# Patient Record
Sex: MALE | Race: AMERICAN INDIAN OR ALASKA NATIVE
[De-identification: names, ages, dates, MRNs, and addresses within clinical notes are randomized per-mention and may not be internally consistent; named-entity substitution may affect disease eponyms.]

---

## 2017-11-14 ENCOUNTER — HOSPITAL ENCOUNTER (EMERGENCY)
Dept: HOSPITAL 46 - ED | Age: 42
Discharge: HOME | End: 2017-11-14
Payer: COMMERCIAL

## 2017-11-14 VITALS — HEIGHT: 69 IN | BODY MASS INDEX: 26.66 KG/M2 | WEIGHT: 180.01 LBS

## 2017-11-14 DIAGNOSIS — Z88.6: ICD-10-CM

## 2017-11-14 DIAGNOSIS — K29.20: Primary | ICD-10-CM

## 2017-11-14 DIAGNOSIS — Z88.0: ICD-10-CM

## 2017-11-14 DIAGNOSIS — F17.200: ICD-10-CM
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
## 2017-11-14 PROCEDURE — G0480 DRUG TEST DEF 1-7 CLASSES: HCPCS

## 2018-05-31 ENCOUNTER — HOSPITAL ENCOUNTER (EMERGENCY)
Dept: HOSPITAL 46 - ED | Age: 43
Discharge: HOME | End: 2018-05-31
Payer: COMMERCIAL

## 2018-05-31 VITALS — BODY MASS INDEX: 26.66 KG/M2 | HEIGHT: 69 IN | WEIGHT: 180.01 LBS

## 2018-05-31 DIAGNOSIS — F17.200: ICD-10-CM
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
## 2018-05-31 DIAGNOSIS — E11.9: ICD-10-CM

## 2018-05-31 DIAGNOSIS — Z88.0: ICD-10-CM

## 2018-05-31 DIAGNOSIS — S30.1XXA: Primary | ICD-10-CM

## 2018-05-31 DIAGNOSIS — S20.211A: ICD-10-CM

## 2018-05-31 DIAGNOSIS — I10: ICD-10-CM

## 2018-05-31 PROCEDURE — G0480 DRUG TEST DEF 1-7 CLASSES: HCPCS

## 2018-06-01 ENCOUNTER — HOSPITAL ENCOUNTER (EMERGENCY)
Dept: HOSPITAL 46 - ED | Age: 43
LOS: 1 days | Discharge: HOME | End: 2018-06-02
Payer: COMMERCIAL

## 2018-06-01 VITALS — BODY MASS INDEX: 25.92 KG/M2 | HEIGHT: 69 IN | WEIGHT: 175 LBS
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
## 2018-06-01 DIAGNOSIS — Z88.0: ICD-10-CM

## 2018-06-01 DIAGNOSIS — F17.200: ICD-10-CM

## 2018-06-01 DIAGNOSIS — I10: ICD-10-CM

## 2018-06-01 DIAGNOSIS — Z88.5: ICD-10-CM

## 2018-06-01 DIAGNOSIS — E11.9: ICD-10-CM

## 2018-06-01 DIAGNOSIS — S20.211A: Primary | ICD-10-CM

## 2018-06-03 ENCOUNTER — HOSPITAL ENCOUNTER (EMERGENCY)
Dept: HOSPITAL 46 - ED | Age: 43
LOS: 1 days | Discharge: HOME | End: 2018-06-04
Payer: COMMERCIAL

## 2018-06-03 VITALS — WEIGHT: 175 LBS | HEIGHT: 69 IN | BODY MASS INDEX: 25.92 KG/M2

## 2018-06-03 DIAGNOSIS — Z88.0: ICD-10-CM

## 2018-06-03 DIAGNOSIS — F10.239: Primary | ICD-10-CM

## 2018-06-03 DIAGNOSIS — F17.200: ICD-10-CM

## 2018-06-03 DIAGNOSIS — Z88.6: ICD-10-CM

## 2018-06-03 DIAGNOSIS — I10: ICD-10-CM

## 2018-06-03 DIAGNOSIS — Z79.899: ICD-10-CM
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
## 2018-06-03 DIAGNOSIS — E11.9: ICD-10-CM

## 2018-06-03 DIAGNOSIS — Z91.14: ICD-10-CM

## 2018-06-03 PROCEDURE — G0480 DRUG TEST DEF 1-7 CLASSES: HCPCS

## 2018-09-25 ENCOUNTER — HOSPITAL ENCOUNTER (EMERGENCY)
Dept: HOSPITAL 46 - ED | Age: 43
Discharge: HOME | End: 2018-09-25
Payer: COMMERCIAL

## 2018-09-25 VITALS — BODY MASS INDEX: 25.92 KG/M2 | HEIGHT: 69 IN | WEIGHT: 175 LBS

## 2018-09-25 DIAGNOSIS — Z88.8: ICD-10-CM

## 2018-09-25 DIAGNOSIS — Z79.899: ICD-10-CM

## 2018-09-25 DIAGNOSIS — Z88.0: ICD-10-CM

## 2018-09-25 DIAGNOSIS — I10: ICD-10-CM

## 2018-09-25 DIAGNOSIS — F17.200: ICD-10-CM

## 2018-09-25 DIAGNOSIS — F43.21: Primary | ICD-10-CM

## 2018-09-25 DIAGNOSIS — E11.9: ICD-10-CM

## 2018-09-25 DIAGNOSIS — F10.10: ICD-10-CM

## 2018-09-25 NOTE — XMS
Clinical Summary
  Created on: 2018
 
 Pramod Arnold
 External Reference #: [id number]
 : 71
 Sex: Male
 
 Demographics
 
 
+-----------------------+--------------------------+
| Address               | [address] |
|                       | [place]  [address]     |
+-----------------------+--------------------------+
| Preferred Language    | Unknown                  |
+-----------------------+--------------------------+
| Marital Status        | Single                   |
+-----------------------+--------------------------+
| Adventism Affiliation | Unknown                  |
+-----------------------+--------------------------+
| Race                  | Unknown                  |
+-----------------------+--------------------------+
| Ethnic Group          | Unknown                  |
+-----------------------+--------------------------+
 
 
 Author
 
 
+--------------+-----------------------+
| Author       | [facility] |
+--------------+-----------------------+
| Organization | [facility] |
+--------------+-----------------------+
| Address      | Unknown               |
+--------------+-----------------------+
| Phone        | Unavailable           |
+--------------+-----------------------+
 
 
 
 Support
 
 
+-----------------+--------------+---------------------+-------------+
| Name            | Relationship | Address             | Phone       |
+-----------------+--------------+---------------------+-------------+
| Dee Browne | ECON         | [address]  | Unavailable |
|                 |              | [address], [place]  |             |
|                 |              |  [address]              |             |
+-----------------+--------------+---------------------+-------------+
 
 
 
 Care Team Providers
 
 
 
+---------------------------+------+-----------------+
| Care Team Member Name     | Role | Phone           |
+---------------------------+------+-----------------+
| Virgie Plasencia | PP   | [phone] |
+---------------------------+------+-----------------+
 
 
 
 Allergies
 
 
+----------------+-------------+----------+----------+----------+
| Active Allergy | Reactions   | Severity | Noted    | Comments |
|                |             |          | Date     |          |
+----------------+-------------+----------+----------+----------+
| Acetaminophen  | Anaphylaxis | High     | 20 |          |
|                |             |          | 15       |          |
+----------------+-------------+----------+----------+----------+
| Penicillins    | Anaphylaxis | High     | 20 |          |
|                |             |          | 15       |          |
+----------------+-------------+----------+----------+----------+
 
 
 
 Current Medications
 
 
+----------------------+----------------------+--------+---------+------+------+-------+
| Prescription         | Sig.                 | Disp.  | Refills | Star | End  | Statu |
|                      |                      |        |         | t    | Date | s     |
|                      |                      |        |         | Date |      |       |
+----------------------+----------------------+--------+---------+------+------+-------+
|   UNKNOWN TO         | Indications: Insulin |        |         |      |      | Activ |
| PATIENTIndications:  |  injection subQ      |        |         |      |      | e     |
| Insulin injection    |                      |        |         |      |      |       |
| subQ                 |                      |        |         |      |      |       |
+----------------------+----------------------+--------+---------+------+------+-------+
|   UNKNOWN TO         | Take  by mouth.      |        |         |      |      | Activ |
| PATIENTIndications:  | Indications:         |        |         |      |      | e     |
| Diabetes Mellitus,   | Diabetes, diabetic   |        |         |      |      |       |
| diabetic medication  | medication           |        |         |      |      |       |
+----------------------+----------------------+--------+---------+------+------+-------+
|   cloNIDine          | Take 0.5 tablets by  |   60   | 0       | 07/0 |      | Activ |
| (CATAPRES) 0.2 MG    | mouth 2 (two) times  | tablet |         | 6/20 |      | e     |
| tablet               | daily.               |        |         | 15   |      |       |
+----------------------+----------------------+--------+---------+------+------+-------+
 
 
 
 Active Problems
 
 
+------------------------------+------------+
| Problem                      | Noted Date |
+------------------------------+------------+
| Alcohol withdrawal           | 2015 |
+------------------------------+------------+
| Elevated blood pressure      | 2015 |
+------------------------------+------------+
| H/O diabetes mellitus        | 2015 |
 
+------------------------------+------------+
| Nausea and vomiting in adult | 2015 |
+------------------------------+------------+
| Smoker                       | 2015 |
+------------------------------+------------+
 
 
 
 Social History
 
 
+-------------------+-------+-----------+--------+------+
| Tobacco Use       | Types | Packs/Day | Years  | Date |
|                   |       |           | Used   |      |
+-------------------+-------+-----------+--------+------+
| Current Some Day  |       |           |        |      |
| Smoker            |       |           |        |      |
+-------------------+-------+-----------+--------+------+
 
 
 
+---------------------+---+---+---+
| Smokeless Tobacco:  |   |   |   |
| Never Used          |   |   |   |
+---------------------+---+---+---+
 
 
 
+--------------------------------------------------------------+
| Tobacco Cessation: Ready to Quit: Yes; Counseling Given: Yes |
+--------------------------------------------------------------+
 
 
 
+-------------+-----------+---------+----------+
| Alcohol Use | Drinks/We | oz/Week | Comments |
|             | ek        |         |          |
+-------------+-----------+---------+----------+
| Yes         |   70 Cans | 42.0    |          |
|             |  of beer  |         |          |
+-------------+-----------+---------+----------+
 
 
 
+------------------+---------------+
| Sex Assigned at  | Date Recorded |
| Birth            |               |
+------------------+---------------+
| Not on file      |               |
+------------------+---------------+
 
 
 
 Last Filed Vital Signs
 
 
+-------------------+----------------------+-------------------------+
| Vital Sign        | Reading              | Time Taken              |
+-------------------+----------------------+-------------------------+
| Blood Pressure    | 112/77               | 2015 11:15 AM PDT |
 
+-------------------+----------------------+-------------------------+
| Pulse             | 86                   | 2015 11:15 AM PDT |
+-------------------+----------------------+-------------------------+
| Temperature       | 36.6   C (97.9   F)  | 2015 11:15 AM PDT |
+-------------------+----------------------+-------------------------+
| Respiratory Rate  | 20                   | 2015 11:15 AM PDT |
+-------------------+----------------------+-------------------------+
| Oxygen Saturation | 97%                  | 2015 11:15 AM PDT |
+-------------------+----------------------+-------------------------+
| Inhaled Oxygen    | -                    | -                       |
| Concentration     |                      |                         |
+-------------------+----------------------+-------------------------+
| Weight            | 84.7 kg (186 lb 11.7 | 2015  5:00 AM PDT |
|                   |  oz)                 |                         |
+-------------------+----------------------+-------------------------+
| Height            | 172.7 cm (5' 8")     | 2015  5:12 AM PDT |
+-------------------+----------------------+-------------------------+
| Body Mass Index   | 28.39                | 2015  5:00 AM PDT |
+-------------------+----------------------+-------------------------+
 
 
 
 Plan of Treatment
 Not on file
 
 Results
 Not on filefrom Last 3 Months
 
 Insurance
 
 
+--------------+--------+-------------+------+-------+---------+
| Payer        | Benefi | Subscriber  | Type | Phone | Address |
|              | t Plan | ID          |      |       |         |
|              |  /     |             |      |       |         |
|              | Group  |             |      |       |         |
+--------------+--------+-------------+------+-------+---------+
| [employer] | [employer] | [id number]   |      |       |         |
|              | WORK   |             |      |       |         |
+--------------+--------+-------------+------+-------+---------+
 
 
 
+-----------------+--------+-------------+--------+-------+----------------------+
| Guarantor Name  | [employer] | Relation to | Date   | Phone | Billing Address      |
|                 | t Type |  Patient    | of     |       |                      |
|                 |        |             | Birth  |       |                      |
+-----------------+--------+-------------+--------+-------+----------------------+
| PRAMOD ARNOLD | Person | Self        | / |       |   [address] |
|                 | al/Fam |             | 1975   |       |  [address]  [place],   |
|                 | [place]    |             |        |       | [place] [address]             |
+-----------------+--------+-------------+--------+-------+----------------------+ DISPLAY PLAN FREE TEXT
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
## 2018-09-25 NOTE — XMS
Clinical Summary
  Created on: 2018
 
 Pramod Arnold
 External Reference #: [id number]
 : 71
 Sex: Male
 
 Demographics
 
 
+-----------------------+--------------------------+
| Address               | [address] |
|                       | [place]  [address]     |
+-----------------------+--------------------------+
| Preferred Language    | Unknown                  |
+-----------------------+--------------------------+
| Marital Status        | Single                   |
+-----------------------+--------------------------+
| Congregation Affiliation | Unknown                  |
+-----------------------+--------------------------+
| Race                  | Unknown                  |
+-----------------------+--------------------------+
| Ethnic Group          | Unknown                  |
+-----------------------+--------------------------+
 
 
 Author
 
 
+--------------+-----------------------+
| Author       | [facility] |
+--------------+-----------------------+
| Organization | [facility] |
+--------------+-----------------------+
| Address      | Unknown               |
+--------------+-----------------------+
| Phone        | Unavailable           |
+--------------+-----------------------+
 
 
 
 Support
 
 
+-----------------+--------------+---------------------+-------------+
| Name            | Relationship | Address             | Phone       |
+-----------------+--------------+---------------------+-------------+
| Dee Browne | ECON         | [address]  | Unavailable |
|                 |              | [address], [place]  |             |
|                 |              |  [address]              |             |
+-----------------+--------------+---------------------+-------------+
 
 
 
 Care Team Providers
 
 
 
+---------------------------+------+-----------------+
| Care Team Member Name     | Role | Phone           |
+---------------------------+------+-----------------+
| Virgie Plasencia | PP   | [phone] |
+---------------------------+------+-----------------+
 
 
 
 Allergies
 
 
+----------------+-------------+----------+----------+----------+
| Active Allergy | Reactions   | Severity | Noted    | Comments |
|                |             |          | Date     |          |
+----------------+-------------+----------+----------+----------+
| Acetaminophen  | Anaphylaxis | High     | 20 |          |
|                |             |          | 15       |          |
+----------------+-------------+----------+----------+----------+
| Penicillins    | Anaphylaxis | High     | 20 |          |
|                |             |          | 15       |          |
+----------------+-------------+----------+----------+----------+
 
 
 
 Current Medications
 
 
+----------------------+----------------------+--------+---------+------+------+-------+
| Prescription         | Sig.                 | Disp.  | Refills | Star | End  | Statu |
|                      |                      |        |         | t    | Date | s     |
|                      |                      |        |         | Date |      |       |
+----------------------+----------------------+--------+---------+------+------+-------+
|   UNKNOWN TO         | Indications: Insulin |        |         |      |      | Activ |
| PATIENTIndications:  |  injection subQ      |        |         |      |      | e     |
| Insulin injection    |                      |        |         |      |      |       |
| subQ                 |                      |        |         |      |      |       |
+----------------------+----------------------+--------+---------+------+------+-------+
|   UNKNOWN TO         | Take  by mouth.      |        |         |      |      | Activ |
| PATIENTIndications:  | Indications:         |        |         |      |      | e     |
| Diabetes Mellitus,   | Diabetes, diabetic   |        |         |      |      |       |
| diabetic medication  | medication           |        |         |      |      |       |
+----------------------+----------------------+--------+---------+------+------+-------+
|   cloNIDine          | Take 0.5 tablets by  |   60   | 0       | 07/0 |      | Activ |
| (CATAPRES) 0.2 MG    | mouth 2 (two) times  | tablet |         | 6/20 |      | e     |
| tablet               | daily.               |        |         | 15   |      |       |
+----------------------+----------------------+--------+---------+------+------+-------+
 
 
 
 Active Problems
 
 
+------------------------------+------------+
| Problem                      | Noted Date |
+------------------------------+------------+
| Alcohol withdrawal           | 2015 |
+------------------------------+------------+
| Elevated blood pressure      | 2015 |
+------------------------------+------------+
| H/O diabetes mellitus        | 2015 |
 
+------------------------------+------------+
| Nausea and vomiting in adult | 2015 |
+------------------------------+------------+
| Smoker                       | 2015 |
+------------------------------+------------+
 
 
 
 Social History
 
 
+-------------------+-------+-----------+--------+------+
| Tobacco Use       | Types | Packs/Day | Years  | Date |
|                   |       |           | Used   |      |
+-------------------+-------+-----------+--------+------+
| Current Some Day  |       |           |        |      |
| Smoker            |       |           |        |      |
+-------------------+-------+-----------+--------+------+
 
 
 
+---------------------+---+---+---+
| Smokeless Tobacco:  |   |   |   |
| Never Used          |   |   |   |
+---------------------+---+---+---+
 
 
 
+--------------------------------------------------------------+
| Tobacco Cessation: Ready to Quit: Yes; Counseling Given: Yes |
+--------------------------------------------------------------+
 
 
 
+-------------+-----------+---------+----------+
| Alcohol Use | Drinks/We | oz/Week | Comments |
|             | ek        |         |          |
+-------------+-----------+---------+----------+
| Yes         |   70 Cans | 42.0    |          |
|             |  of beer  |         |          |
+-------------+-----------+---------+----------+
 
 
 
+------------------+---------------+
| Sex Assigned at  | Date Recorded |
| Birth            |               |
+------------------+---------------+
| Not on file      |               |
+------------------+---------------+
 
 
 
 Last Filed Vital Signs
 
 
+-------------------+----------------------+-------------------------+
| Vital Sign        | Reading              | Time Taken              |
+-------------------+----------------------+-------------------------+
| Blood Pressure    | 112/77               | 2015 11:15 AM PDT |
 
+-------------------+----------------------+-------------------------+
| Pulse             | 86                   | 2015 11:15 AM PDT |
+-------------------+----------------------+-------------------------+
| Temperature       | 36.6   C (97.9   F)  | 2015 11:15 AM PDT |
+-------------------+----------------------+-------------------------+
| Respiratory Rate  | 20                   | 2015 11:15 AM PDT |
+-------------------+----------------------+-------------------------+
| Oxygen Saturation | 97%                  | 2015 11:15 AM PDT |
+-------------------+----------------------+-------------------------+
| Inhaled Oxygen    | -                    | -                       |
| Concentration     |                      |                         |
+-------------------+----------------------+-------------------------+
| Weight            | 84.7 kg (186 lb 11.7 | 2015  5:00 AM PDT |
|                   |  oz)                 |                         |
+-------------------+----------------------+-------------------------+
| Height            | 172.7 cm (5' 8")     | 2015  5:12 AM PDT |
+-------------------+----------------------+-------------------------+
| Body Mass Index   | 28.39                | 2015  5:00 AM PDT |
+-------------------+----------------------+-------------------------+
 
 
 
 Plan of Treatment
 Not on file
 
 Results
 Not on filefrom Last 3 Months
 
 Insurance
 
 
+--------------+--------+-------------+------+-------+---------+
| Payer        | Benefi | Subscriber  | Type | Phone | Address |
|              | t Plan | ID          |      |       |         |
|              |  /     |             |      |       |         |
|              | Group  |             |      |       |         |
+--------------+--------+-------------+------+-------+---------+
| [employer] | [employer] | [id number]   |      |       |         |
|              | WORK   |             |      |       |         |
+--------------+--------+-------------+------+-------+---------+
 
 
 
+-----------------+--------+-------------+--------+-------+----------------------+
| Guarantor Name  | [employer] | Relation to | Date   | Phone | Billing Address      |
|                 | t Type |  Patient    | of     |       |                      |
|                 |        |             | Birth  |       |                      |
+-----------------+--------+-------------+--------+-------+----------------------+
| PRAMOD ARNOLD | Person | Self        | / |       |   [address] |
|                 | al/Fam |             | 1975   |       |  [address]  [place],   |
|                 | [place]    |             |        |       | [place] [address]             |
+-----------------+--------+-------------+--------+-------+----------------------+

## 2018-09-26 NOTE — EKG
[facility]
                                    [address] [place]  [address]
_________________________________________________________________________________________
                                                                 Signed   
 
 
Sinus tachycardia
Moderate voltage criteria for LVH, may be normal variant
Borderline ECG
When compared with ECG of 03-JUN-2018 23:36,
No significant change was found
Confirmed by TIN MONROE MD ([id number]) on 9/26/2018 12:17:09 PM
 
 
 
 
 
 
 
 
 
 
 
 
 
 
 
 
 
 
 
 
 
 
 
 
 
 
 
 
 
 
 
 
 
 
 
 
 
 
 
    Electronically Signed By: TIN MONROE MD  09/26/18 1217
_________________________________________________________________________________________
PATIENT NAME:     ANALIA GOMEZ               
MEDICAL RECORD #: [id number]                     Electrocardiogram             
          ACCT #: [id number]  
DATE OF BIRTH:   [birth year]                                       
PHYSICIAN:   TIN MONROE MD           REPORT #: [id number]
REPORT IS CONFIDENTIAL AND NOT TO BE RELEASED WITHOUT AUTHORIZATION

## 2020-07-17 NOTE — XMS
PreManage Notification: ANALIA GOMEZ MRN:[id number]
 
Security Information
 
Security Events
No recent Security Events currently on file
 
 
 
CRITERIA MET
------------
- Group Notification
 
 
CARE PROVIDERS
-------------------------------------------------------------------------------------
MARY ROY     Physician Assistant: Surgical     07/31/2018-Current
 
PHONE: Unknown
-------------------------------------------------------------------------------------
 
[facility] has no Care Guidelines for this patient.
Care History
Medical/Surgical
09/26/2018    [facility]
 
      - [facility] CONTACTED PATIENT PCP OFFICE AND NOTIFIED OF PATIENT CURRENT MENTAL 
      HEALTH CONDITION DUE TO PATIENT RECENT LOSS OF LONG TIME GIRLFRIEND.
      - CHW CONTACTED BEHAVIORAL HEALTH AT [facility] AND LEFT A MESSAGE WITH 
      ERIN- COUNSELOR AT [facility].
      - [facility] IS UNABLE TO CONTACT PATIENT DUE TO PATIENT PHONE GOING STRAIGHT TO 
      VOICEMAIL AND NO VOICEMAIL IS SET UP.
      - PLEASE REFER PATIENT TO BEHAVIORAL HEALTH SERVICES AT [facility] [phone] IF SEEN IN THE ED, THEY CAN HELP WITH SUBSTANCE ABUSE AND GRIEF SUPPORT.
06/06/2018    [facility]
 
      - Patient has not seen PCP since December 2017. Patient has cancelled and or 
      no showed to all appointments since December.
      - [facility] has tried to work with this patient but patient 
      no shows to appointments.
      - Patient should be directed to [facility] for all non emergent care. If he 
      calls first thing in the morning to [facility] he can be seen for same day
      appointment.
      - Patient was last seen on December 13 th and has done no shows 4 times to the 
      PCP apts.
      - Patient sees [facility] for Podiatry appointments. Care Recommendation: 
      - USE EXTREME CAUTION IN GIVING NARCOTICS TO THIS PATIENT. 
      - Avoid Discharge Narcotic prescriptions if at all possible. Please use 
      clinical judgement. This patient has had 5 or more Emergency Department visits
      in the last 12 months.\T\nbsp; Patient requires education on the scope and
      purpose of the ED as an acute care provider not a Primary Care Provider and
      should not be utilized for chronic conditions.\T\nbsp; If patient returns to
      ED please contact Community Health WorkerKatrina at [phone]. These are
      guidelines and the provider should exercise clinical judgment when providing
      care
E.D. VISIT COUNT (12 MO.)
-------------------------------------------------------------------------------------
2 [facility]
 
 
3 [facility]
 
1 [facility]
-------------------------------------------------------------------------------------
TOTAL 6
-------------------------------------------------------------------------------------
NOTE: Visits indicate total known visits.
 
ED/C VISIT TRACKING (12 MO.)
-------------------------------------------------------------------------------------
07/17/2020 13:49
[facility] [place]
 
TYPE: Emergency
 
COMPLAINT:
- L ARM NUMBNESS   PAIN
-------------------------------------------------------------------------------------
05/11/2020 03:27
[facility] [place]
 
TYPE: Emergency
 
DIAGNOSES:
- Alcohol dependence with withdrawal, uncomplicated
- Leg Pain
- Withdrawal (Alcohol)
-------------------------------------------------------------------------------------
03/31/2020 07:05
[facility] [place]
 
TYPE: Emergency
 
COMPLAINT:
- Pain in left thigh
 
DIAGNOSES:
1. Pain in left thigh
-------------------------------------------------------------------------------------
03/31/2020 01:12
[facility] [place]
 
TYPE: Emergency
 
COMPLAINT:
- LEG PAIN
- Pain in left thigh
 
DIAGNOSES:
1. Pain in left thigh
-------------------------------------------------------------------------------------
[facility]2020 [facility]     [place]
 
TYPE: Emergency
 
DIAGNOSES:
- Cough
- Acute upper respiratory infection, unspecified
-------------------------------------------------------------------------------------
 
12/26/2019 23:12
[facility]     [place]
 
TYPE: Emergency
 
DIAGNOSES:
- Alcohol abuse, uncomplicated
- Alcohol dependence with withdrawal delirium
- Essential (primary) hypertension
- Other visual disturbances
- Abdominal Pain
- Anemia, unspecified
- Nonspecific elevation of levels of transaminase and lactic ac
- Alcohol dependence, uncomplicated
- Alcohol dependence with withdrawal, uncomplicated
- Withdrawal (Alcohol)
- Hematemesis
- Type 2 diabetes mellitus without complications
- Hematemesis
-------------------------------------------------------------------------------------
 
 
INPATIENT VISIT TRACKING (12 MO.)
-------------------------------------------------------------------------------------
12[facility]26/2019 23:12
[facility]     [place]
 
TYPE: Internal Medicine
 
DIAGNOSES:
- Alcohol dependence with withdrawal delirium
- Nicotine dependence, unspecified, uncomplicated
- Nonspecific elevation of levels of transaminase and lactic ac
- Hemorrhage of anus and rectum
- Other visual disturbances
- Hematemesis
- Major depressive disorder, recurrent, unspecified
- Alcohol dependence with withdrawal, uncomplicated
- Essential (primary) hypertension
- Alcohol dependence, uncomplicated
- Type 2 diabetes mellitus without complications
- Alcohol abuse, uncomplicated
- Anemia, unspecified
-------------------------------------------------------------------------------------
 
[email or web address]

## 2020-07-17 NOTE — EKG
[facility]
                                    [address]
                                  [place], [place]  [address]
_________________________________________________________________________________________
                                                                 Signed   
 
 
Sinus tachycardia
Minimal voltage criteria for LVH, may be normal variant
Borderline ECG
When compared with ECG of 25-SEP-2018 16:30,
No significant change was found
Confirmed by SANDOR LINTON DO ([id number]) on 7/17/2020 2:54:53 PM
 
 
 
 
 
 
 
 
 
 
 
 
 
 
 
 
 
 
 
 
 
 
 
 
 
 
 
 
 
 
 
 
 
 
 
 
 
 
 
    Electronically Signed By: SANDOR LINTON DO  07/17/20 1455
_________________________________________________________________________________________
PATIENT NAME:     ROYCEONEILANALIAWALTER MURGUIA               
MEDICAL RECORD #: [id number]                     Electrocardiogram             
          ACCT #: [id number]  
DATE OF BIRTH:   [birth year]                                       
PHYSICIAN:   SANDOR LINTON DO                     REPORT #: [id number]
REPORT IS CONFIDENTIAL AND NOT TO BE RELEASED WITHOUT AUTHORIZATION

## 2020-07-17 NOTE — XMS
Clinical Summary
  Created on: 2020
 
 Pramod Arnold
 External Reference #: [id number]
 : 71
 Sex: Male
 
 Demographics
 
 
+-----------------------+--------------------------+
| Address               | [address] |
|                       | [place]  [address]     |
+-----------------------+--------------------------+
| Preferred Language    | Unknown                  |
+-----------------------+--------------------------+
| Marital Status        | Single                   |
+-----------------------+--------------------------+
| Congregation Affiliation | Unknown                  |
+-----------------------+--------------------------+
| Race                  | Unknown                  |
+-----------------------+--------------------------+
| Ethnic Group          | Unknown                  |
+-----------------------+--------------------------+
 
 
 Author
 
 
+--------------+--------------------------------------------+
| Author       | [facility]  |
|              | and [facility]                                |
+--------------+--------------------------------------------+
| Organization | [facility]  |
|              | and [facility]                                |
+--------------+--------------------------------------------+
| Address      | Unknown                                    |
+--------------+--------------------------------------------+
| Phone        | Unavailable                                |
+--------------+--------------------------------------------+
 
 
 
 Care Team Providers
 
 
+-----------------------+------+-------------+
| Care Team Member Name | Role | Phone       |
+-----------------------+------+-------------+
 PCP  | Unavailable |
+-----------------------+------+-------------+
 
 
 
 Allergies
 Not on File
 
 
 Medications
 Not on file
 
 Active Problems
 Not on file
 
 Social History
 
 
+-------------------+-------+-----------+--------+------+
| Tobacco Use       | Types | Packs/Day | Years  | Date |
|                   |       |           | Used   |      |
+-------------------+-------+-----------+--------+------+
| Current Some Day  |       |           |        |      |
| Smoker            |       |           |        |      |
+-------------------+-------+-----------+--------+------+
 
 
 
+------------------+---------------+
| Sex Assigned at  | Date Recorded |
| Birth            |               |
+------------------+---------------+
| Not on file      |               |
+------------------+---------------+
 
 
 
 Last Filed Vital Signs
 Not on file
 
 Plan of Treatment
 
 
+---------------------+-----------+-------+----------+
| Health Maintenance  | Due Date  | Last  | Comments |
|                     |           | Done  |          |
+---------------------+-----------+-------+----------+
| Vaccine:            |  |       |          |
| Dtap/Tdap/Td (1 -   | 2         |       |          |
| Tdap)               |           |       |          |
+---------------------+-----------+-------+----------+
| Vaccine: Influenza  |  |       |          |
| (#1)                | 0         |       |          |
+---------------------+-----------+-------+----------+
 
 
 
 Results
 Not on filefrom Last 3 Months

## 2020-07-17 NOTE — XMS
Encounter Summary
  Created on: 2020
 
 Pramod Arnold
 External Reference #: [id number]
 : 71
 Sex: Male
 
 Demographics
 
 
+-----------------------+--------------------------+
| Address               | [address] |
|                       | [place]  [address]     |
+-----------------------+--------------------------+
| Preferred Language    | Unknown                  |
+-----------------------+--------------------------+
| Marital Status        | Single                   |
+-----------------------+--------------------------+
| Uatsdin Affiliation | Unknown                  |
+-----------------------+--------------------------+
| Race                  | Unknown                  |
+-----------------------+--------------------------+
| Ethnic Group          | Unknown                  |
+-----------------------+--------------------------+
 
 
 Author
 
 
+--------------+--------------------------------------------+
| Author       | [facility]  |
|              | and [facility]                                |
+--------------+--------------------------------------------+
| Organization | [facility]  |
|              | and [place]                                |
+--------------+--------------------------------------------+
| Address      | Unknown                                    |
+--------------+--------------------------------------------+
| Phone        | Unavailable                                |
+--------------+--------------------------------------------+
 
 
 
 Care Team Providers
 
 
+-----------------------+------+-------------+
| Care Team Member Name | Role | Phone       |
+-----------------------+------+-------------+
 PCP  | Unavailable |
+-----------------------+------+-------------+
 
 
 
 Encounter Details
 
 
 
+--------+-----------+----------------------+----------------------+----------------------+
| Date   | Type      | Department           | Care Team            | Description          |
+--------+-----------+----------------------+----------------------+----------------------+
| / | Hospital  |   [facility]    |   Roxana Garner | Nausea and vomiting  |
| 2015 - | Encounter | [facility]       |  MD [address]     | in adult; Alcohol    |
|        |           | CLINICAL DECISION    | [address]           | withdrawal,          |
| / |           | [address]  [address] | [place] [address]   | uncomplicated (HCC); |
|    |           |   [place], [place]       | [phone]         |  Elevated LDH; H/O   |
|        |           | [address]           | [phone] (Fax)   | diabetes mellitus;   |
|        |           | [phone]         |                      | Elevated blood       |
|        |           |                      |                      | pressure; Alcohol    |
|        |           |                      |                      | withdrawal, with     |
|        |           |                      |                      | unspecified          |
|        |           |                      |                      | complication (HCC);  |
|        |           |                      |                      | Smoker               |
+--------+-----------+----------------------+----------------------+----------------------+
 
 
 
 Social History
 
 
+-------------------+-------+-----------+--------+------+
| Tobacco Use       | Types | Packs/Day | Years  | Date |
|                   |       |           | Used   |      |
+-------------------+-------+-----------+--------+------+
| Current Some Day  |       |           |        |      |
| Smoker            |       |           |        |      |
+-------------------+-------+-----------+--------+------+
 
 
 
+------------------+---------------+
| Sex Assigned at  | Date Recorded |
| Birth            |               |
+------------------+---------------+
| Not on file      |               |
+------------------+---------------+
 documented as of this encounter
 
 Discharge Summaries
 Efrain Montoya MD - 2015  5:29 PM PDTFormatting of this note might be different from th
e original.
 Discharge Summaries by Efrain Montoya MD at 07/07/15 1729  
  Author:  Efrain Montoya MD Service:  Hospitalist Author Type:  Physician 
  Filed:  07/07/15 1731 Date of Service:  07/07/15 1729 Status:  Signed 
  :  Efrain Montoya MD (Physician)   
   
 [facility]
 Service:  Hospitalist
 Discharge Summary
 
 Date of Admission:  2015
 Date of Discharge:  2015
 
 Discharge Physician:  Efrain Montoya MD
 Treatment Team:
  Admitting Provider: Roxana Garner MD
 Discharge Diagnoses:   
 
 
 Principal Problem:
   Alcohol withdrawal (HCC)
 Active Problems:
   Elevated blood pressure
   H/O diabetes mellitus
   Nausea and vomiting in adult
   Smoker
 Resolved Problems:
   * No resolved hospital problems. *
 
 Procedures:  * No surgery found *
 
 Significant Diagnostic Studies:  
 
 BRIEF HISTORY OF PRESENTATION:  
      Pramod Arnold is a 39 y.o. male who presented with N/V and alcohol withdrawal please
 refer to H&P for details 
 HOSPITAL COURSE:  
 1. Acute alcohol withdrawal,uncomplictaed with DTs,conitnue clonidine,he reports he has thi
amine and folic acid at home,he is counseled about alcohol abstinence and also patient couns
eled for alcohol rehabilitation on discharge.
 
 2. Nausea and vomiting likely from alcohol withdrawal resolved
 
 3. Diabetes type 2 insulin as home regimen 
 
 4. Hypertension continue clonidine and check BP at home follow up with PCP
 
 5. Active smoking. Counseled about smoking cessation.he will think about it 
 
 He was AXOX3,all his questions addressed and he was discharged in a stable condition covere
d with side effects of newly medications and especially warned about the reaction of alcohol
 and ativan which could be deadly.
 Past Medical History   
 Diagnosis  Date 
   Diabetes mellitus, type 2 (HCC)  
   Hypertension  
   Alcoholism (HCC)  
 
 Past Surgical History     
 Procedure   Laterality Date 
   Back surgery    
   low back surgery as infant    
   Spine surgery    
 
 Allergies   
 Allergen  Reactions 
   Acetaminophen Anaphylaxis 
   Penicillins Anaphylaxis 
 
 No prescriptions prior to admission 
 
 DISCHARGE EXAM
 Vital Signs:
 /77 | Pulse 86 | Temp(Src) 97.9 F (36.6 C) (Oral) | Resp 20 | Ht 1.727 m (5' 8") 
| Wt 84.7 kg (186 lb 11.7 oz) | BMI 28.40 kg/m2 | SpO2 97%
 
 General appearance: alert, appears stated age and cooperative
 Head: Normocephalic, without obvious abnormality, atraumatic
 Neck: no adenopathy, no carotid bruit, no JVD, supple, symmetrical, trachea midline and thy
 
roid not enlarged, symmetric, no tenderness/mass/nodules
 Lungs: clear to auscultation bilaterally
 Heart: regular rate and rhythm, S1, S2 normal, no murmur, click, rub or gallop
 Abdomen: soft, non-tender; bowel sounds normal; no masses,  no organomegaly
 Extremities: extremities normal, atraumatic, no cyanosis or edema
 Pulses: 2+ and symmetric
 Neurologic: Grossly normal AXOX3
 
 DATA
 
 CBC:  
 Lab Results    
 Component  Value Date 
  WBC 6.37 2015 
  RBC 4.66 2015 
  HGB 13.9 2015 
  HCT 42.8 2015 
  MCV 91.8 2015 
  MCH 29.9 2015 
  MCHC 32.6 2015 
  RDW 40.3 2015 
   2015 
  MPV 9.3 2015 
  DIFFTYPE AUTOMATED 2015 
 
 CMP:  
 Lab Results    
 Component  Value Date 
  * 2015 
  K 3.7 2015 
  CL 99 2015 
  CO2 28 2015 
  ANIONGAP 11 2015 
  GLUF 103* 2015 
  BUN 5* 2015 
  CREATININE 0.59* 2015 
  BCR 8 2015 
  CA 9.3 2015 
  PROT 7.9 2015 
  ALB 4.1 2015 
  GLOB 3.8 2015 
  BILITOT 0.9 2015 
  ALP 73 2015 
  AST 30 2015 
  ALT 33 2015 
  EGFR >60 2015 
 
 Disposition:  Home
 Condition:  Stable
 Code Status:  Prior
 
 Discharge Instructions
 Diet Diabetic 
 
 Diet Cardiac 
 
 Activity as Tolerated 
 
 Call MD for: Temperature > 100.4F (38C) 
 
 
 Call MD for:  Persistant Nausea and Vomiting 
 
 Call MD for:  Severe Uncontrolled Pain 
 
 Call MD for:  Redness, Tenderness, or Signs of Infection (Pain, Swelling, Redness, Odor or 
Green/Yellow Discharge Around Incision Site) 
 
 Call MD for:  Difficulty Breathing, Headache or Visual Disturbances 
 
 Call MD for:  Hives 
 
 Call MD for:  Persistant Dizziness or Light-Headedness 
 
 Call MD for: Extreme Fatigue 
 
 Follow up:
 [facility]
 PO 
 [facility] [address]
 [phone]
 
 Schedule an appointment as soon as possible for a visit in 1 week
 
  
 Medication List 
  
 START taking these medications 
    
  cloNIDine 0.2 MG tablet 
 QTY:  60 tablet 
 Refills:  0 
 Doctor's comments:  
 - Hold for SBP less than 100
 
 - Hold for HR less than 60 
 Commonly known as:  CATAPRES 
 Take 0.5 tablets by mouth 2 (two) times daily. 
  
  LORazepam 1 MG tablet 
 QTY:  30 tablet 
 Refills:  0 
 Commonly known as:  ATIVAN 
 Take 1 tablet by mouth every 8 (eight) hours as needed for Anxiety (DO NOT COMBINE WITH ALC
OHOL OR NARCOTICS). 
  
  
 CONTINUE taking these medications 
    
  UNKNOWN TO PATIENT 
 Refills:  0 
  
  UNKNOWN TO PATIENT 
 Refills:  0 
  
  
 STOP taking these medications 
    
  UNKNOWN TO PATIENT 
  
   
 
 Where to Get Your Medications  
  These are the prescriptions that you need to . 
    
 You may get the following medications from any pharmacy 
 -  cloNIDine 0.2 MG tablet 
 -  LORazepam 1 MG tablet 
  
  
  
  
   
 
 Discharge took over 30 minutes, to include final examination, discussion of admission, and 
preparation of prescriptions, instructions for on-going care, follow-up and documentation of
 discharge summary.
 
 Efrain Montoya MD
 @td
  
  Electronically signed by Efrain Montoya MD at 2015  5:34 PM PDTdocumented in this encou
nter
 
 Progress Notes
 Conversion Transaction, Provider Unknown - 2015  3:02 PM PDTFormatting of this note m
ight be different from the original.
 Nurse Progress Note by Batsheva Ball RN at 07/06/15 1502  
  Author:  Batsheva Ball RN Service:  (none) Author Type:  Registered Nurse 
  Filed:  07/06/15 1503 Date of Service:  07/06/15 1502 Status:  Signed 
  :  Batsheva aBll RN (Registered Nurse)   
   
 Patient discharged to private residence per taxi/bus to [facility] [place]. Discharge instructio
ns, prescriptions, and follow up appointments discussed. All questions answered. 
  
  Electronically signed by Conversion Transaction, Provider at 2019  8:37 PM PDTConver
jacob Transaction, Provider Unknown - 2015  9:43 AM PDTFormatting of this note might be
 different from the original.
 Case Management by Tiffany No RN at 07/06/15 0943  
  Author:  Tiffany No RN Service:  (none) Author Type:  Registered Nurse 
  Filed:  07/06/15 1020 Date of Service:  07/06/15 0943 Status:  Addendum 
  :  Tiffany No RN (Registered Nurse)   
  Related Notes:  Original Note by Tiffany No RN (Registered Nurse) filed at 07/06/15 09
48   
   
 Patient discharging home today,  He is agreeable with transportation via [employer] t
o [place], next available time is this afternoon at 16:24.  
 
 [employer] to transport patient to [address] [place] [address] at 15:45, [employer] dispa
[facility] states they will notify [employer]  regarding patient and to be aware of his need for
 transportation to [place] today.
  
  Electronically signed by Conversion Transaction, Provider at 2019  8:40 PM PDTConver
jacob Transaction, Provider Unknown - 2015  5:12 AM PDTFormatting of this note might be
 different from the original.
 Nurse Progress Note by Cindy Evans RN at 07/06/15 0512  
  Author:  Cindy Evans RN Service:  (none) Author Type:  Registered Nurse 
  Filed:  07/06/15 0515 Date of Service:  07/06/15 0512 Status:  Signed 
  :  Cindy Evans RN (Registered Nurse)   
   
 Patient resting in bed.  Vital signs have been stable.  He has been afebrile.  No complaint
s of nausea, vomiting, or pain.  CIWA scores <3 for NOC.  Patient up to walk halls at beginn
 
ing of shift stating "I walked 22 laps".  No acute changes from previous assessment. Patient
 visualized hourly and needs addressed.
 Cindy Evans RN 2015 5:15 AM 
  
  Electronically signed by Conversion Transaction, Provider at 2019  8:37 PM Efrain Pantoja MD - 2015  9:51 PM PDTFormatting of this note might be different from the origi
nal.
 Progress Notes by Efrain Montoya MD at 07/05/15 2151  
  Author:  Efrain Montoya MD Service:  Hospitalist Author Type:  Physician 
  Filed:  07/05/15 2157 Date of Service:  07/05/15 2151 Status:  Signed 
  :  Efrain Montoya MD (Physician)   
   
 [facility]
 Service:  Hospitalist
 Progress Note
 
 Hospital Day:   LOS: 1 day 
 
 SUBJECTIVE
 
      Patient Summary:   refer to H&P for details
 
      Events Overnight:  Pt seen and examined,denies CP or SOB or abdominal pain,he has mild
 alcohol withdrawals,elevated BP, good appetite,he again reports he wants to be sober.
 
 Scheduled Medications 
   chlordiazePOXIDE  10 mg Oral BID 
   cloNIDine  0.2 mg Oral BID 
   enoxaparin  40 mg Subcutaneous Q24H 
   folic acid (FOLVITE) IVPB  1 mg Intravenous Q24H 
  Or    
   prenatal multivitamin & minerals w iron/FA  1 tablet Oral Q24H 
   insulin aspart  0-3 Units Subcutaneous Nightly 
   insulin aspart  0-6 Units Subcutaneous TID AC 
   [START ON 2015] lisinopril  20 mg Oral Daily 
   nicotine  1 patch Transdermal Daily 
   thiamine (VITAMIN B1) IVPB  100 mg Intravenous Q24H 
  Or    
   thiamine  100 mg Oral Q24H 
 
 Continuous Infusions 
   dextrose   
 
 PRN Medications
 aluminum-magnesium hydroxide-simethicone, dextrose, dextrose, dextrose, diazepam, diazepam 
**OR** diazepam, diphenhydrAMINE, glucagon, glucagon, labetalol, LORazepam **OR** LORazepam,
 ondansetron **OR** ondansetron, polyethylene glycol, promethazine
 
 OBJECTIVE
 Vital Signs:
 /99 | Pulse 91 | Temp(Src) 98.1 F (36.7 C) (Oral) | Resp 18 | Ht 1.727 m (5' 8") 
| Wt 81.3 kg (179 lb 3.7 oz) | BMI 27.26 kg/m2 | SpO2 97%
 
 General appearance: alert, appears stated age, cooperative, fatigued, no distress and shake
y
 Head: Normocephalic, without obvious abnormality, atraumatic
 Neck: no adenopathy, no carotid bruit, no JVD, supple, symmetrical, trachea midline and thy
roid not enlarged, symmetric, no tenderness/mass/nodules
 Lungs: clear to auscultation bilaterally
 Heart: regular rate and rhythm, S1, S2 normal, no murmur, click, rub or gallop
 
 Abdomen: soft, non-tender; bowel sounds normal; no masses,  no organomegaly
 Extremities: extremities normal, atraumatic, no cyanosis or edema
 Pulses: 2+ and symmetric
 Neurologic: Grossly normal,tremors,gait is deferred 
 
 DATA
 
 CBC:  
 Lab Results    
 Component  Value Date 
  WBC 6.37 2015 
  RBC 4.66 2015 
  HGB 13.9 2015 
  HCT 42.8 2015 
  MCV 91.8 2015 
  MCH 29.9 2015 
  MCHC 32.6 2015 
  RDW 40.3 2015 
   2015 
  MPV 9.3 2015 
  DIFFTYPE AUTOMATED 2015 
 
 CMP:  
 Lab Results    
 Component  Value Date 
  * 2015 
  K 3.7 2015 
  CL 99 2015 
  CO2 28 2015 
  ANIONGAP 11 2015 
  GLUF 103* 2015 
  BUN 5* 2015 
  CREATININE 0.59* 2015 
  BCR 8 2015 
  CA 9.3 2015 
  PROT 7.9 2015 
  ALB 4.1 2015 
  GLOB 3.8 2015 
  BILITOT 0.9 2015 
  ALP 73 2015 
  AST 30 2015 
  ALT 33 2015 
  EGFR >60 2015 
 
 Recent Labs
 Lab 07/05/15
 0541 
 MG 1.9 
 
 PROBLEM LIST
 
 Principal Problem:
   Alcohol withdrawal (HCC)
 Active Problems:
   Elevated blood pressure
   H/O diabetes mellitus
   Nausea and vomiting in adult
   Smoker
 
 ASSESSMENT & PLAN
 
 1. Acute alcohol withdrawal, slowly improving,continue librium,CIWa,clonidine,thiamine and 
folic acid, telemetry,SLIVF  Patient counseled about alcohol abstinence and also patient cou
nseled for alcohol rehabilitation on discharge will consult [facility] for recourses
 
 2. Nausea and vomiting likely from alcohol withdrawal,improving continue Phenergan and Zofr
an IV as needed.
 
 3. Diabetes type 2 continue low ISS and monitor BG adjust as needed
 
 4. Hypertension continue clonidine and increase lisinopril V as needed BP meds 
 
 5. Active smoking. Counseled about smoking cessation. Offered nicotine patch.
 
 6.DVT px Lovenox subQ and SCDs if tolerated
 
 Disposition:  Admitted 
 Code Status:  Full Code
 
 Efrain Montoya MD
 2015
  
  Electronically signed by Efrain Montoya MD at 2015  9:57 PM PDTConversion Transaction, 
Provider Unknown - 2015  6:39 AM PDTFormatting of this note might be different from th
e original.
 Progress Notes by Arleen Gipson RPH at 07/05/15 0639  
  Author:  Arleen Gipson RPH Service:  (none) Author Type:  Pharmacist 
  Filed:  07/05/15 0639 Date of Service:  07/05/15 0639 Status:  Signed 
  :  Arleen Gipson RPH (Pharmacist)   
   
 Estimated crcl > 100 ml/min based on scr of 0.6
 No renal dosage adjustments needed.
 
  
  Electronically signed by Conversion Transaction, Provider at 2019  8:43 PM PDTConver
jacob Transaction, Provider Unknown - 2015  5:54 AM PDTFormatting of this note might be
 different from the original.
 Nurse Progress Note by Viri Apodaca RN at 07/05/15 0554  
  Author:  Viri Apodaca RN Service:  (none) Author Type:  Registered Nurse 
  Filed:  07/05/15 0557 Date of Service:  07/05/15 0554 Status:  Signed 
  :  Viri Apodaca RN (Registered Nurse)   
   
 Pt slept comfortably throughout the shift with no complaints of nausea, headache, or tremor
s. He states that he is interested in an outpatient program for his alcoholism but that it i
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 His blood pressure was still elevated 150's even with
 the newly added clonidine, otherwise vital's are stable. Will continue to monitor. Viri trinh RN
 
  
  Electronically signed by Conversion Transaction, Provider at 2019  8:42 PM PDTConver
jacob Transaction, Provider Unknown - 2015  6:12 PM PDTFormatting of this note might be
 different from the original.
 Nurse Progress Note by Cici Cummings RN at 07/04/15 1812  
  Author:  Cici Cummings RN Service:  (none) Author Type:  Registered Nurse 
  Filed:  07/04/15 1824 Date of Service:  07/04/15 1812 Status:  Signed 
  :  Cici Cummings RN (Registered Nurse)   
   
 The patient's CIWA scores have ranged from 2-6 throughout the shift (the score of 2 was obt
ained towards the end of the shift).  He has endured occasional nausea, mild itching, sweats
, and anxiety along with a persistent tremor, which has varied in intensity throughout the d
ay.  CIWA scores have consistently remained negative for auditory or visual disturbances, he
 
adaches, agitation, or disorientation.  The patient has received four 5 mg doses of diazepam
, the last of which was oral, as the patient had some difficulty with tolerating the IV form
.  Seizure precautions are in place and there is suction at the bedside.   
 
 The patient's BP has remained in the 140's-150's systolic.  The patient was given lisinopri
l this morning, but reports that his BP tends to run within these parameters as he "forgets 
to take his medications" at home.  VS otherwise stable.  The patient did, however, require 1
 unit of coverage for his blood sugars this afternoon.  
 
 Cici Cummings RN
 2015
 6:22 PM
   
   
  
  Electronically signed by Conversion Transaction, Provider at 2019  8:57 PM PDTConver
jacob Transaction, Provider Unknown - 2015  3:07 PM PDTFormatting of this note might be
 different from the original.
 Case Management by Fidelina Benavides RN at 07/04/15 5406  
  Author:  Fidelina Benavides RN Service:  (none) Author Type:  Registered Nurse 
  Filed:  07/04/15 8009 Date of Service:  07/04/15 1507 Status:  Signed 
  :  Fidelina Benavides RN (Registered Nurse)   
   
 
  07/04/15 7948 
 Discharge Planning Evaluation  
 Admitting Diagnosis EtoH withdrawal 
 Readmission No 
 Living Arrangements Alone 
 Support Systems Friends/neighbors 
 Type of Residence Private residence 
 Mental Status Oriented 
 Anticipated Discharge Plan  
 Post Acute Care Needs Other (comment)
 (AA meetings and [facility] location for insurance through the state of [place] for counseling service
s ([facility] its drug and Etoh counseling)) 
 Plan communicated to patient/family Yes 
 Resources  
 Financial concerns Yes
 (concerned he will lose his job if he doesn't receive a release from work for being hospita
lized) 
 Transportation issues No
 (friend will provide transportation) 
 Patient/Family concerns No 
 Prescription Plan No 
 Previous home health equipment No 
 Vascular access device No 
 Ostomy/Drains/Appliances No 
 Anticipated Disposition  
 Facility Type Home 
 Medicare Important Message (SONJA) Not applicable 
 Met with: Pt and discussed discharge planning, Pt is a 39 y.o., male who has had an intermi
ttent issue with EtoH and understands he needs help.  Pt desires an outpatient setting due t
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  P
t states he will start on a daily AA meeting that occurs at noon close to where he works (wa
lking distance).  Pt also admitted that counseling may be a good choice to get beyond why he
 drinks but states the [facility] drug and alcohol center was closed.  CM suggested g
oing to [facility] to determine what state insurance he may qualify for that has counseling with t
he coverage.  Provided the address and phone number and gave a copy to the patient and liste
d on AVS.
 
 
 Patient's PCP is: PER PT NONE, [facility]
 
 Patient's insurance: ITH ([facility])
 Coverage concerns: no
 Medication coverage/concerns: no
 [facility] Bedside Delivery: no
 
 Community resources utilized / needed: EtoH tx/support in [place], [place]
 
 Assistance in transportation: no
 
 Identification of any specific education / training: no
 
 Barriers to Discharge / Alternative housing needed: no
 
 Anticipated DCP: home to [place], friend to drive
 
 Fidelina Benavides
 
  
  Electronically signed by Conversion Transaction, Provider at 2019  9:01 PM PDTConver
jacob Transaction, Provider Unknown - 2015  6:31 AM PDTFormatting of this note might be
 different from the original.
 Nurse Progress Note by Masha Bustos RN at 07/04/15 0631  
  Author:  Masha Bustos RN Service:  (none) Author Type:  Registered Nurse 
  Filed:  07/04/15 0633 Date of Service:  07/04/15 0631 Status:  Signed 
  :  Masha Bustos RN (Registered Nurse)   
   
 Patient resting comfortably in bed. Patient has no complaints of nausea, alert and oriented
, stated that he feels tired and would like to rest. VSS, will continue to monitor. Masha harley
 
  
  Electronically signed by Conversion Transaction, Provider at 2019  8:58 PM PDTConver
jacob Transaction, Provider Unknown - 2015  5:37 AM PDTFormatting of this note might be
 different from the original.
 Progress Notes by Mesfin Pennington RPH at 07/04/15 0537  
  Author:  Mesfin Pennington RPH Service:  (none) Author Type:  Pharmacist 
  Filed:  07/04/15 0537 Date of Service:  07/04/15 0537 Status:  Signed 
  :  Mesfin Pennington RPH (Pharmacist)   
   
 Pharmacy will renal dose as needed once labs are available.
  
  Electronically signed by Conversion Transaction, Provider at 2019  8:44 PM PDTdocume
nted in this encounter
 
 H&P Notes
 Roxana Garner MD - 2015  7:36 AM PDTFormatting of this note might be dif
ferent from the original.
 H&P by Roxana Garner MD at 07/04/15 0736  
  Author:  Roxana Garner MD Service:  Hospitalist Author Type:  Physician 
  Filed:  07/04/15 1024 Date of Service:  07/04/15 0736 Status:  Signed 
  :  Roxana Garner MD (Physician)   
  Related Notes:  Original Note by Roxana Garner MD (Physician) filed at 07/04/15 0740   
   
 [facility]
 Service:  Hospitalist
 Admission History & Physical
 
 
 Date of Admission:  2015
 Requesting Physician: [facility]
 Reason for Admission:  ETOH withdrawal.
 History Obtained From:  patient
 CHIEF COMPLAINT:  Nausea and vomiting.
 HISTORY OF PRESENT ILLNESS
 The patient is a 39 y.o. male with significant past medical history.
 39 year old male transferred to this facility from [facility] for admission
 here 
 (lack of bed availability at their facility). The patient presented to their ED earlier tod
ay with nausea and vomiting. 
 He has a history of alcohol abuse with his last drink about 24+ hours ago. Medical history 
also includes diabetes. 
 On arrival there, he was tachycardic and diaphoretic with nausea and vomiting. Labs reveale
d an elevated lactic 
 acid at 2.8. He was given fluids and zofran as well as valium for treatment of alcohol with
drawals. 
 On arrival here, he is alert and oriented. Denies any pain or nausea and he has normal gilmar
ls.
 Patient take HTN and DM, 3 medications, but can't name them.
 
 REVIEW OF SYSTEMS
 Negative except for pertinent items noted in HPI.
 
 Past Medical History   
 Diagnosis  Date 
   Diabetes mellitus, type 2 (HCC)  
   Hypertension  
   Alcoholism (HCC)  
 
 Past Surgical History     
 Procedure   Laterality Date 
   Back surgery    
   low back surgery as infant    
   Spine surgery    
 
 Immunizations:  Influenza:  Not indicated 
             Pneumoccocal:  Not indicated 
 
 Allergies   
 Allergen  Reactions 
   Acetaminophen Anaphylaxis 
   Penicillins Anaphylaxis 
 
 Prescriptions prior to admission     
 Medication  Sig Dispense Refill 
   UNKNOWN TO PATIENT 2 (two) times daily. Indications: Antihypertensive   
   UNKNOWN TO PATIENT Indications: Insulin injection subQ   
   UNKNOWN TO PATIENT Take  by mouth. Indications: Diabetes, diabetic medication   
 
 History reviewed. No pertinent family history.
 
 History 
 
 Social History    
   Marital Status:  Single 
   Spouse Name: N/A 
   Number of Children: N/A 
   Years of Education:  N/A 
 
 
 Occupational History  
   Not on file. 
 
 Social History Main Topics    
   Smoking status:  Current Some Day Smoker 
   Smokeless tobacco:  Never Used 
   Alcohol Use:  42.0 oz/week 
   70 Cans of beer per week  
   Drug Use:  No 
   Sexual Activity:  Not on file 
 
 Other Topics  Concern 
   Not on file  
 
 Social History Narrative  
   No narrative on file 
 
 PHYSICAL EXAM
 Vital Signs:
 /101 | Pulse 87 | Temp(Src) 97.7 F (36.5 C) (Oral) | Resp 16 | Ht 1.727 m (5' 8")
 | Wt 81.5 kg (179 lb 10.8 oz) | BMI 27.33 kg/m2 | SpO2 96%
 
 General Appearance:    Alert, cooperative, no distress, appears stated age 
 Head:    Normocephalic, without obvious abnormality, atraumatic 
 Eyes:    PERRL, conjunctiva/corneas clear, EOM's intact, fundi 
   benign, both eyes      
   
 Nose:   Nares normal,  no drainage    or sinus tenderness 
 Throat:   Dry mucous membranes, Lips, mucosa, and tongue normal; teeth and gums normal 
 Neck:   Supple, symmetrical, trachea midline, no adenopathy;    
   thyroid:  No enlargement/tenderness/nodules; no carotid
   bruit or JVD 
 Back:     Symmetric, no curvature, ROM normal, no CVA tenderness 
 Lungs:     Clear to auscultation bilaterally, respirations unlabored 
 Chest wall:    No tenderness or deformity 
 Heart:    Regular rate and rhythm, S1 and S2 normal, no murmur, rub   or gallop 
 Abdomen:     Soft, non-tender, bowel sounds active all four quadrants, 
   no masses, no organomegaly 
 Extremities:   Extremities normal, atraumatic, no cyanosis or edema 
 Pulses:   2+ and symmetric all extremities 
 Skin:   Skin color, texture, turgor normal, no rashes or lesions 
 Lymph nodes:   Cervical, supraclavicular, and axillary nodes normal 
 Neurologic:   CNII-XII intact. Normal strength, sensation and reflexes   
   Throughout  +mild hand tremors. 
 
 DATA
 CBC:  No results found for: WBC, RBC, HGB, HCT, MCV, MCH, MCHC, RDW, PLT, MPV, DIFFTYPE
 CMP:  No results found for: NA, K, CL, CO2, ANIONGAP, GLUF, BUN, CREATININE, BCR, CA, PROT,
 ALB, GLOB, AGRATIO, BILITOT, ALP, AST, ALT, EGFR
 PT/INR:  No results found for: PROTIME, INR
 
 PROBLEM LIST
 
 Principal Problem:
   Alcohol withdrawal (HCC)
 Active Problems:
   Elevated blood pressure
   H/O diabetes mellitus
   Nausea and vomiting in adult
   Smoker
 
 
 ASSESSMENT & PLAN
 
 1. Acute alcohol withdrawal, slowly improving after IV hydration, Valium, and Ativan. Also 
patient will be started on alcohol withdrawal protocol and telemetry monitor his heart rate 
had been normalized. Continue IV hydration. Patient counseled about alcohol abstinence and a
lso patient counseled for alcohol rehabilitation on discharge.
 2. Nausea and vomiting likely from alcohol withdrawal, currently improving. Will continue P
henergan and Zofran IV as needed.
 3. Diabetes type 2. Patient is unable to give medication name or list. Will start him on No
voLog sliding scale, check hemoglobin .
 4. Hypertension. Will start him on lisinopril 10 mg and Vasotec IV as needed.
 5. Active smoking. Counseled about smoking cessation. Offered nicotine patch.
 6. FULL CODE.
 7. Time for admission 60 minutes.
 8. Estimated hospital stay 2 nights.
  
 
 Disposition: Admit to medical floor.
 
 Code Status:  Full Code.
 
 Primary Care Physician:  PER PT NONE
 
 Roxana Garner MD
 2015
 
  
  Electronically signed by Jamir, Transcription Conversion at 2019  8:59 PM PDTdocumente
d in this encounter
 
 ED Notes
 Conversion Transaction, Provider Unknown - 2015  5:04 AM PDTFormatting of this note m
ight be different from the original.
 ED Notes by Berna Mckenzie RN at 07/04/15 0504  
  Author:  Berna Mckenzie RN Service:  Emergency Department Author Type:  Registered Nurse 
  Filed:  07/04/15 0504 Date of Service:  07/04/15 0504 Status:  Signed 
  :  Berna Mckenzie RN (Registered Nurse)   
   
 Dr. Garner has not put in order for lisinopril to be given now, pt was transferred to floor 
with KENDELL Flanagan. Masha was informed of this. Bedside report given to Masha GIFFORD RN. 
 
 Berna Mckenzie RN
 07/04/15 6261
  
  Electronically signed by Conversion Transaction, Provider at 2019  8:47 PM PDTConver
jacob Transaction, Provider Unknown - 2015  4:50 AM PDTFormatting of this note might be
 different from the original.
 ED Notes by Berna Mckenzie RN at 07/04/15 0450  
  Author:  Berna Mckenzie RN Service:  Emergency Department Author Type:  Registered Nurse 
  Filed:  07/04/15 0451 Date of Service:  07/04/15 0450 Status:  Signed 
  :  Berna Mckenzie RN (Registered Nurse)   
   
 Dr. Garner paged, I cannot pull the lisinopril from the pyxis as the order is for 0900 
 
 Berna Mckenzie RN
 07/04/15 578
  
  Electronically signed by Conversion Transaction, Provider at 2019  8:46 PM PDTConver
jacob Transaction, Provider Unknown - 2015  4:46 AM PDTFormatting of this note might be
 
 different from the original.
 ED Notes by Berna Mckenzie RN at 07/04/15 0446  
  Author:  eBrna Mckenzie RN Service:  Emergency Department Author Type:  Registered Nurse 
  Filed:  07/04/15 0446 Date of Service:  07/04/15 0446 Status:  Signed 
  :  Berna Mckenzie RN (Registered Nurse)   
   
 Dr. Garner requests lisinopril be given at this time. 
 
 Berna Mckenzie RN
 07/04/15 0446
  
  Electronically signed by Conversion Transaction, Provider at 2019  8:49 PM PDTConver
jacob Transaction, Provider Unknown - 2015  4:40 AM PDTFormatting of this note might be
 different from the original.
 ED Notes by Berna Mckenzie RN at 07/04/15 0440  
  Author:  Berna Mckenzie RN Service:  Emergency Department Author Type:  Registered Nurse 
  Filed:  07/04/15 0441 Date of Service:  07/04/15 0440 Status:  Signed 
  :  Berna Mckenzie RN (Registered Nurse)   
   
 3OP lead called and informed pt is ready for admit 
 
 Berna Mckenzie RN
 07/04/15 0441
  
  Electronically signed by Conversion Transaction, Provider at 2019  8:49 PM PDTConver
jacob Transaction, Provider Unknown - 2015  4:33 AM PDTFormatting of this note might be
 different from the original.
 ED Notes by Berna Mckenzie RN at 07/04/15 0433  
  Author:  Berna Mckenzie RN Service:  Emergency Department Author Type:  Registered Nurse 
  Filed:  07/04/15 0433 Date of Service:  07/04/15 0433 Status:  Signed 
  :  Berna Mckenzie RN (Registered Nurse)   
   
 IV Benadryl given per Dr. Garner's orders. 
 
 Berna Mckenzie RN
 07/04/15 0433
  
  Electronically signed by Conversion Transaction, Provider at 2019  8:48 PM PDTConver
jacob Transaction, Provider Unknown - 2015  4:12 AM PDTFormatting of this note might be
 different from the original.
 ED Notes by Berna Mckenzie RN at 07/04/15 0412  
  Author:  Berna Mckenzie RN Service:  Emergency Department Author Type:  Registered Nurse 
  Filed:  07/04/15 0413 Date of Service:  07/04/15 0412 Status:  Signed 
  :  Berna Mckenzie RN (Registered Nurse)   
   
 hospitalist at bedside 
 
 Berna Mckenzie RN
 07/04/15 0413
  
  Electronically signed by Conversion Transaction, Provider at 2019  8:52 PM PDTConver
jacob Transaction, Provider Unknown - 2015  3:12 AM PDTFormatting of this note might be
 different from the original.
 ED Notes by Berna Mckenzie RN at 07/04/15 0312  
  Author:  Berna Mckenzie RN Service:  Emergency Department Author Type:  Registered Nurse 
  Filed:  07/04/15 0312 Date of Service:  07/04/15 0312 Status:  Signed 
  :  Berna Mckenzie RN (Registered Nurse)   
   
 Kimberly informed of pt's blood sugar, crackers and juice given 
 
 
 Berna Mckenzie RN
 07/04/15 0312
  
  Electronically signed by Conversion Transaction, Provider at 2019  8:51 PM PDTConver
jacob Transaction, Provider Unknown - 2015  2:58 AM PDTFormatting of this note might be
 different from the original.
 ED Notes by Berna Mckenzie RN at 07/04/15 0258  
  Author:  Berna Mckenzie RN Service:  Emergency Department Author Type:  Registered Nurse 
  Filed:  07/04/15 0258 Date of Service:  07/04/15 0258 Status:  Signed 
  :  Berna Mckenzie RN (Registered Nurse)   
   
 Pt states he hasnt eaten in 1.5 days, and his sugar was 108 when he was in [place]. 
 
 Berna Mckenzie RN
 07/04/15 0258
  
  Electronically signed by Conversion Transaction, Provider at 2019  8:50 PM PDTHarrin
gtonOdalys - 2015  1:30 AM PDTFormatting of this note might be different from the o
riginal.
 ED Provider Notes by Odalys Huertas PA-C at 07/04/15 0130  
  Author:  Odalys Huertas PA-C Service:  Emergency Department Author Type:  Physician A
ssistant - Certified 
  Filed:  07/04/15 0243 Date of Service:  07/04/15 0130 Status:  Attested 
  :  Odalys Huertas PA-C (Physician Assistant - Certified)  Cosigner:  Kayleigh alford DO at 07/04/15 0429 
  Attestation signed by Rachel M Cookson, DO at 07/04/15 0429  
 I have discussed this patient's case with the Advanced Practice Clinician, and provided yonatan
ropriate supervision.  We have discussed all likely DDX and have addressed these appropriate
ly.  I agree with his assessment and plan considering the HPI and physical exam.    
 
  
 
  
  
   
 Procedures
 [facility]
 Department of Emergency Medicine
 
 Pre-arrival Provider: Another ED
 Provider Name: Dr. Abbasi ([facility])
 Pertinent History and Concerns: Alcohol withdrawal, last drink 20 hours ago, no bed availab
le, persistent N/V, tachy 115, diaphoretic.  hx DM
 Relevant Labs or Studies: Lactic acid 2.8, down to 2.2 after 2 L NS
 Relevant Medications: Valium with some improvement
 (07/03/15 1939 : Bryan German DO)
 HPI 
 History of Present Illness 
 
 Patient Identification
 Pramod Arnold is a 39 y.o. male.
 
 Patient information was obtained from patient.
 History/Exam limitations: none.
 Patient presented to the Emergency Department by: [facility]
 
 Chief Complaint 
 Chief Complaint  
 Patient presents with  
   Emesis 
 
   Nausea 
 
 39 year old male transferred to this facility from [facility] in [place] for admission
 here (lack of bed availability at their facility).  The patient presented to their ED earli
er today with nausea and vomiting.  He has a history of alcohol abuse with his last drink ab
out 24+ hours ago.  Medical history also includes diabetes.  On arrival there, he was tachyc
ardic and diaphoretic with nausea and vomiting.  Labs revealed an elevated lactic acid at 2.
8.  He was given fluids and zofran as well as valium for treatment of alcohol withdrawals.  
On arrival here, he is alert and oriented.  Denies any pain or nausea and he has normal gilmar
ls.
 
 Past Medical History   
 Diagnosis  Date 
   Diabetes mellitus, type 2 (HCC)  
   Hypertension  
   Alcoholism (HCC)  
 
 Past Surgical History     
 Procedure   Laterality Date 
   Back surgery    
   low back surgery as infant    
 
 Prior to Admission medications  
 Not on File 
 
 No Known Allergies
 History 
 
 Social History    
   Marital Status:  Single 
   Spouse Name: N/A 
   Number of Children: N/A 
   Years of Education:  N/A 
 
 Occupational History  
   Not on file. 
 
 Social History Main Topics    
   Smoking status:  Current Some Day Smoker 
   Smokeless tobacco:  Never Used 
   Alcohol Use:  6.0 oz/week 
   10 Cans of beer per week  
   Drug Use:  No 
   Sexual Activity:  Not on file 
 
 Other Topics  Concern 
   Not on file  
 
 Social History Narrative  
   No narrative on file 
 
 History reviewed. No pertinent family history.
  Review of Systems 
 Constitutional: Negative for fever and chills. 
 Respiratory: Negative for shortness of breath.  
 Cardiovascular: Negative for chest pain and palpitations. 
 Gastrointestinal: Positive for nausea, vomiting and abdominal pain. Negative for diarrhea a
nd constipation. 
 Genitourinary: Negative for dysuria. 
 Neurological: Negative for headaches. 
 
 Psychiatric/Behavioral: Positive for substance abuse. 
 All other systems reviewed and are negative.
 
 Physical Exam 
 Physical Exam 
 
 /104 | Pulse 91 | Temp(Src) 98.1 F (36.7 C) (Oral) | Resp 18 | Wt 79.379 kg (175 
lb) | SpO2 95%
 
 Pulse Oximetry interpretation: Normal
 
 General: Alert, in no apparent distress with elevated blood pressure but o/w normal vitals 
as above.
 Eyes:  Normal inspection, pupils equal and round, non-icteric
 ENT:  Moist mucous membranes.
 Neck:  Normal inspection
   Supple
 Cardiovascular: Rate and rhythm normal
   No murmurs
 Respiratory: Breath sounds normal bilaterally.  Respirations are non-labored
 Abdomen: Soft, non-tender, non-distended
   No guarding or rebound
 Genitourinary: Deferred
 Rectal exam: Deferred
 Skin:  Color normal
   Warm and dry
   No rash
 Neuro:  No motor deficit
   No sensory deficit
   Alert and oriented.  CN grossly intact.  Moves all extremities purposefully.
 
 ED Course 
 Medical Decision Making and Emergency Department Course 
 Patient transferred here for admission for treatment of nausea and vomiting and alcohol wit
hdrawals.  He is pain free and hemodynamically stable at this time.  I will continue IV flui
ds of NS and admit to the hospitalist.  I have reviewed labs performed at [facility].
 
 ED Department Course
 Spoke with Dr. Garner who agrees to admit the patient to the hospitalist service.
 
 Records Reviewed
 Records sent with patient from [facility]
 
 Labs & Radiology Results 
 Laboratory Evaluation
 Results  
  None 
   
 
 Radiology and EKG Evaluation
 Imaging Results  
  None 
   
 
 Diagnosis & Disposition 
 ED Diagnoses   
  
  Final diagnoses  
  Nausea and vomiting in adult  
 
  Alcohol withdrawal, uncomplicated (HCC)  
  Elevated LDH  
  H/O diabetes mellitus  
  Elevated blood pressure  
    
 
 Disposition:
 ED Disposition 
  Admit/Observation Bed request special needs: None
 Diagnosis?: Nausea and vomiting, alcohol withdrawal, elevated LDH, H/O DM
  
  
 
 Odalys Huertas PA-C
 07/04/15 0243
 
 Rachel M Cookson, 
 07/04/15 0429
  
  Electronically signed by Odalys Huertas PA-C at 2019  9:03 PM PDTConversion T
ransaction, Provider Unknown - 2015  1:05 AM PDTFormatting of this note might be diffe
rent from the original.
 ED Notes by Berna Mckenzie RN at 07/04/15 0105  
  Author:  Berna Mckenize RN Service:  Emergency Department Author Type:  Registered Nurse 
  Filed:  07/04/15 0106 Date of Service:  07/04/15 0105 Status:  Signed 
  :  Berna Mckenzie RN (Registered Nurse)   
   
 Registration called, informed of wrong . 
 
 Berna Mckenzie RN
 07/04/15 0106
  
  Electronically signed by Conversion Transaction, Provider at 2019  8:55 PM PDTConver
jacob Transaction, Provider Unknown - 2015 12:56 AM PDTFormatting of this note might be
 different from the original.
 ED Notes by Berna Mckenzie RN at 07/04/15 0056  
  Author:  Berna Mckenzie RN Service:  Emergency Department Author Type:  Registered Nurse 
  Filed:  07/04/15 0057 Date of Service:  07/04/15 0056 Status:  Signed 
  :  Berna Mckenzie RN (Registered Nurse)   
   
 Pt last drink was at 1100 yesterday morning. He has diffuse abd pain that began a few days.
 He also has nausea, no vomiting. No fevers. He drinks 10 etoh drinks/day
 
 Berna Mckenzie RN
 07/04/15 0057
  
  Electronically signed by Conversion Transaction, Provider at 2019  8:56 PM PDTConver
jacob Transaction, Provider Unknown - 2015 12:49 AM PDTFormatting of this note might be
 different from the original.
 ED Notes by Berna Mckenzie RN at 07/04/15 0049  
  Author:  Berna Mckenzie RN Service:  Emergency Department Author Type:  Registered Nurse 
  Filed:  07/04/15 0049 Date of Service:  07/04/15 0049 Status:  Signed 
  :  Berna Mckenzie RN (Registered Nurse)   
   
 Pt pt given 5mg of valium by EMS prior to transport. 
 
 Berna Mckenzie RN
 07/04/15 0049
  
  Electronically signed by Conversion Transaction, Provider at 2019  8:54 PM PDTConver
 
jacob Transaction, Provider Unknown - 2015 11:35 PM PDTFormatting of this note might be
 different from the original.
 ED Notes by Evelyn Jara RN at 07/03/15 2335  
  Author:  Evelyn Jara RN Service:  (none) Author Type:  Registered Nurse 
  Filed:  07/03/15 2339 Date of Service:  07/03/15 2335 Status:  Signed 
  :  Evelyn Jara RN (Registered Nurse)   
   
 Report from: KENDELL Gutierrez at [facility] 
 Came in with nausea. Admits to alcohol intoxication. Being transferred for admit for DT's, 
their hospital is full. 
 3L NS
 5mg x3 valium
 HR: 104, BP: 158/99 BS: 148
 Lactic: 2.8
 Allergic to PCN and tylenol. 
 
 Evelyn Jara RN
 07/03/15 2339
  
  Electronically signed by Conversion Transaction, Provider at 2019  9:04 PM PDTdocume
nted in this encounter
 
 Miscellaneous Notes
 Plan of Care - Conversion Transaction, Provider Unknown - 2015 10:56 PM PDTFormatting
 of this note might be different from the original.
 Plan of Care by Cindy Evans RN at 07/05/15 2256  
  Author:  Cindy Evans RN Service:  (none) Author Type:  Registered Nurse 
  Filed:  07/05/15 2256 Date of Service:  07/05/15 2256 Status:  Signed 
  :  Cindy Evans RN (Registered Nurse)   
   
 Problem: Safety
 Goal: Patient will be injury free during hospitalization
 Assess and monitor vitals signs, neurological status including level of consciousness and o
rientation. Assess patient  s risk for falls and implement fall prevention plan of care and
 interventions per hospital policy. 
 
 Ensure arm band on, uncluttered walking paths in room, adequate room lighting, call light a
nd overbed table within reach, bed in low position, wheels locked, side rails up per policy,
 and non-skid footwear provided. 
 Outcome: Progressing
 Patient educated on call light use.  Call light within reach.  Bed alarm on for safety.  No
n skid foot wear use encouraged.  Room cleared of extra items.
 
   
  
  Electronically signed by Conversion Transaction, Provider at 2019  8:41 PM PDTPlan o
f Care - Efrain Montoya MD - 2015  8:00 PM PDTFormatting of this note might be different
 from the original.
 Plan of Care by Efrain Montoya MD at 07/04/15 2000  
  Author:  Efrain Montoya MD Service:  Hospitalist Author Type:  Physician 
  Filed:  07/04/15 2119 Date of Service:  07/04/15 2000 Status:  Signed 
  :  Efrain Montoya MD (Physician)   
   
 Patient seen and examined,AXOX3 for now reports he would like to be detox ed,he has shaky h
ands and sweaty,elevated BP,continue CIWA,IVF ,add librium BID,monitor on tele,electrolytes.
 
 
 Wt Readings from Last 3 Encounters:  
 07/04/15 81.5 kg (179 lb 10.8 oz) 
 
 
 Temp Readings from Last 3 Encounters:  
 07/04/15 98.5 F (36.9 C) Oral 
 
 BP Readings from Last 3 Encounters:  
 07/04/15 155/108 
 
 Pulse Readings from Last 3 Encounters:  
 07/04/15 86 
 
 Lab Results    
 Component  Value Date 
  WBC 5.90 2015 
  HGB 13.9 2015 
  HCT 42.1 2015 
  MCV 91.4 2015 
   2015 
 
 GLUCOSE      
 Date   Value Ref Range Status 
 2015   102* 65 - 99 mg/dL Final 
   Comment:    
   [facility] [address] [place] [address]  [id number]    
 
 BUN      
 Date   Value Ref Range Status 
 2015   6* 8 - 25 mg/dL Final 
   Comment:    
   [facility] [address] [place] [address]  [id number]    
 
 CREATININE      
 Date   Value Ref Range Status 
 2015   0.60* 0.70 - 1.30 mg/dL Final 
   Comment:    
   [facility] [address] [place] [address]  [address]    
 
 BUN/CREAT      
 Date   Value Ref Range Status 
 2015   10  Final 
   Comment:    
   [facility] [address] [place] [address]  [id number]    
 
 TOTAL PROTEIN      
 Date   Value Ref Range Status 
 2015   7.7 6.3 - 8.2 g/dL Final 
   Comment:    
   [facility] [address] [place] [address]  [address]    
 
 GLOBULIN      
 Date   Value Ref Range Status 
 2015   3.6 1.3 - 4.9 g/dL Final 
   Comment:    
   [facility] [address] [place] [address]  [address]    
 
 TBIL      
 Date   Value Ref Range Status 
 2015   0.8 0.1 - 1.5 mg/dL Final 
   Comment:    
   [facility] [address] [place] [address]  [address]    
 
 ALT      
 
 Date   Value Ref Range Status 
 2015   31 10 - 65 U/L Final 
   Comment:    
   [facility] [address] [place] [address]  [address]    
 
 AST      
 Date   Value Ref Range Status 
 2015   29 10 - 45 U/L Final 
   Comment:    
   [facility] [address] [place] [address]  [address]    
 
 SODIUM      
 Date   Value Ref Range Status 
 2015   134* 135 - 143 mmol/L Final 
   Comment:    
   [facility] [address] [place] [address]  [address]    
 
 POTASSIUM      
 Date   Value Ref Range Status 
 2015   3.5 3.5 - 4.9 mmol/L Final 
   Comment:    
   [facility] [address] [place] [address]  [address]    
 
 CHLORIDE      
 Date   Value Ref Range Status 
 2015   100 99 - 109 mmol/L Final 
   Comment:    
   Testing performed at [address]  [address]    
 
 CO2      
 Date   Value Ref Range Status 
 2015   28 23 - 32 mmol/L Final 
   Comment:    
   [facility] [address] [place] [address]  [address]    
 
 ANION GAP AGAP      
 Date   Value Ref Range Status 
 2015   10 5 - 20 mmol/L Final 
   Comment:    
   [facility], [address] [place]  [address]    
 
  
  Electronically signed by Efrain Montoya MD at 2015  9:19 PM PDTdocumented in this encou
nter
 
 Plan of Treatment
 Not on filedocumented as of this encounter
 
 Procedures
 
 
+-------------------+--------+-------------+----------------------+----------------------+
| Procedure Name    | Priori | Date/Time   | Associated Diagnosis | Comments             |
|                   | ty     |             |                      |                      |
+-------------------+--------+-------------+----------------------+----------------------+
| POC GLUCOSE       | Routin | 2015  |                      |   Results for this   |
|                   | e      | 11:16 AM    |                      | procedure are in the |
|                   |        | PDT         |                      |  results section.    |
+-------------------+--------+-------------+----------------------+----------------------+
| POC GLUCOSE       | Routin | 2015  |                      |   Results for this   |
 
|                   | e      |  4:58 AM    |                      | procedure are in the |
|                   |        | PDT         |                      |  results section.    |
+-------------------+--------+-------------+----------------------+----------------------+
| POC GLUCOSE       | Routin | 2015  |                      |   Results for this   |
|                   | e      |  9:27 PM    |                      | procedure are in the |
|                   |        | PDT         |                      |  results section.    |
+-------------------+--------+-------------+----------------------+----------------------+
| POC GLUCOSE       | Routin | 2015  |                      |   Results for this   |
|                   | e      |  4:03 PM    |                      | procedure are in the |
|                   |        | PDT         |                      |  results section.    |
+-------------------+--------+-------------+----------------------+----------------------+
| POC GLUCOSE       | Routin | 2015  |                      |   Results for this   |
|                   | e      | 11:36 AM    |                      | procedure are in the |
|                   |        | PDT         |                      |  results section.    |
+-------------------+--------+-------------+----------------------+----------------------+
| EXTERNAL LAB: CBC | Routin | 2015  |                      |   Results for this   |
|                   | e      |  5:41 AM    |                      | procedure are in the |
|                   |        | PDT         |                      |  results section.    |
+-------------------+--------+-------------+----------------------+----------------------+
| PHOSPHORUS        | Routin | 2015  |                      |   Results for this   |
|                   | e      |  5:41 AM    |                      | procedure are in the |
|                   |        | PDT         |                      |  results section.    |
+-------------------+--------+-------------+----------------------+----------------------+
| MAGNESIUM         | Routin | 2015  |                      |   Results for this   |
|                   | e      |  5:41 AM    |                      | procedure are in the |
|                   |        | PDT         |                      |  results section.    |
+-------------------+--------+-------------+----------------------+----------------------+
| COMPREHENSIVE     | Routin | 2015  |                      |   Results for this   |
| METABOLIC PANEL   | e      |  5:41 AM    |                      | procedure are in the |
|                   |        | PDT         |                      |  results section.    |
+-------------------+--------+-------------+----------------------+----------------------+
| POC GLUCOSE       | Routin | 2015  |                      |   Results for this   |
|                   | e      |  5:12 AM    |                      | procedure are in the |
|                   |        | PDT         |                      |  results section.    |
+-------------------+--------+-------------+----------------------+----------------------+
| POC GLUCOSE       | Routin | 2015  |                      |   Results for this   |
|                   | e      |  9:14 PM    |                      | procedure are in the |
|                   |        | PDT         |                      |  results section.    |
+-------------------+--------+-------------+----------------------+----------------------+
| POC GLUCOSE       | Routin | 2015  |                      |   Results for this   |
|                   | e      |  4:07 PM    |                      | procedure are in the |
|                   |        | PDT         |                      |  results section.    |
+-------------------+--------+-------------+----------------------+----------------------+
| EXTERNAL LAB: CBC | Routin | 2015  |                      |   Results for this   |
|                   | e      | 11:20 AM    |                      | procedure are in the |
|                   |        | PDT         |                      |  results section.    |
+-------------------+--------+-------------+----------------------+----------------------+
| POC GLUCOSE       | Routin | 2015  |                      |   Results for this   |
|                   | e      | 11:20 AM    |                      | procedure are in the |
|                   |        | PDT         |                      |  results section.    |
+-------------------+--------+-------------+----------------------+----------------------+
| PHOSPHORUS        | Routin | 2015  |                      |   Results for this   |
|                   | e      | 11:20 AM    |                      | procedure are in the |
|                   |        | PDT         |                      |  results section.    |
+-------------------+--------+-------------+----------------------+----------------------+
| MAGNESIUM         | Routin | 2015  |                      |   Results for this   |
|                   | e      | 11:20 AM    |                      | procedure are in the |
|                   |        | PDT         |                      |  results section.    |
+-------------------+--------+-------------+----------------------+----------------------+
| COMPREHENSIVE     | Routin | 2015  |                      |   Results for this   |
 
| METABOLIC PANEL   | e      | 11:20 AM    |                      | procedure are in the |
|                   |        | PDT         |                      |  results section.    |
+-------------------+--------+-------------+----------------------+----------------------+
| LIPID PANEL       | Routin | 2015  |                      |   Results for this   |
|                   | e      |  5:38 AM    |                      | procedure are in the |
|                   |        | PDT         |                      |  results section.    |
+-------------------+--------+-------------+----------------------+----------------------+
| TSH               | Routin | 2015  |                      |   Results for this   |
|                   | e      |  5:38 AM    |                      | procedure are in the |
|                   |        | PDT         |                      |  results section.    |
+-------------------+--------+-------------+----------------------+----------------------+
| POC GLUCOSE       | Routin | 2015  |                      |   Results for this   |
|                   | e      |  5:13 AM    |                      | procedure are in the |
|                   |        | PDT         |                      |  results section.    |
+-------------------+--------+-------------+----------------------+----------------------+
| POC GLUCOSE       | Routin | 2015  |                      |   Results for this   |
|                   | e      |  4:33 AM    |                      | procedure are in the |
|                   |        | PDT         |                      |  results section.    |
+-------------------+--------+-------------+----------------------+----------------------+
| ALCOHOL           | Routin | 2015  |                      |   Results for this   |
|                   | e      |  3:06 AM    |                      | procedure are in the |
|                   |        | PDT         |                      |  results section.    |
+-------------------+--------+-------------+----------------------+----------------------+
| POC GLUCOSE       | Routin | 2015  |                      |   Results for this   |
|                   | e      |  3:01 AM    |                      | procedure are in the |
|                   |        | PDT         |                      |  results section.    |
+-------------------+--------+-------------+----------------------+----------------------+
 documented in this encounter
 
 Results
 POC Glucose (2015 11:16 AM PDT)
 
+-------------+--------------------------+---------------+------------+--------------+
| Component   | Value                    | Ref Range     | Performed  | Pathologist  |
|             |                          |               | At         | Signature    |
+-------------+--------------------------+---------------+------------+--------------+
| Glucose,    | 102 (H)Comment: Testing  | 65 - 99 mg/dL | EXTERNAL   |              |
| Fingerstick | performed at [facility];[address]     |               | LAB        |              |
|             | [address];[place], [place]   |               |            |              |
|             | [id number]                    |               |            |              |
+-------------+--------------------------+---------------+------------+--------------+
 
 
 
+----------+
| Specimen |
+----------+
|          |
+----------+
 
 
 
+----------------+---------+--------------------+--------------+
| Performing     | Address | City/State/Zipcode | Phone Number |
| Organization   |         |                    |              |
+----------------+---------+--------------------+--------------+
|   EXTERNAL LAB |         |                    |              |
+----------------+---------+--------------------+--------------+
 POC Glucose (2015  4:58 AM PDT)
 
 
+-------------+-------------------------+---------------+------------+--------------+
| Component   | Value                   | Ref Range     | Performed  | Pathologist  |
|             |                         |               | At         | Signature    |
+-------------+-------------------------+---------------+------------+--------------+
| Glucose,    | 94Comment: Testing      | 65 - 99 mg/dL | EXTERNAL   |              |
| Fingerstick | performed at [facility];[address]    |               | LAB        |              |
|             | [address];[place]  |               |            |              |
|             | [id number]                   |               |            |              |
+-------------+-------------------------+---------------+------------+--------------+
 
 
 
+----------+
| Specimen |
+----------+
|          |
+----------+
 
 
 
+----------------+---------+--------------------+--------------+
| Performing     | Address | City/State/Zipcode | Phone Number |
| Organization   |         |                    |              |
+----------------+---------+--------------------+--------------+
|   EXTERNAL LAB |         |                    |              |
+----------------+---------+--------------------+--------------+
 POC Glucose (2015  9:27 PM PDT)
 
+-------------+--------------------------+---------------+------------+--------------+
| Component   | Value                    | Ref Range     | Performed  | Pathologist  |
|             |                          |               | At         | Signature    |
+-------------+--------------------------+---------------+------------+--------------+
| Glucose,    | 132 (H)Comment: Testing  | 65 - 99 mg/dL | EXTERNAL   |              |
| Fingerstick | performed at [facility];[address]     |               | LAB        |              |
|             | [address];[place], [place]   |               |            |              |
|             | [id number]                    |               |            |              |
+-------------+--------------------------+---------------+------------+--------------+
 
 
 
+----------+
| Specimen |
+----------+
|          |
+----------+
 
 
 
+----------------+---------+--------------------+--------------+
| Performing     | Address | City/State/Zipcode | Phone Number |
| Organization   |         |                    |              |
+----------------+---------+--------------------+--------------+
|   EXTERNAL LAB |         |                    |              |
+----------------+---------+--------------------+--------------+
 POC Glucose (2015  4:03 PM PDT)
 
+-------------+-------------------------+---------------+------------+--------------+
| Component   | Value                   | Ref Range     | Performed  | Pathologist  |
|             |                         |               | At         | Signature    |
+-------------+-------------------------+---------------+------------+--------------+
 
| Glucose,    | 95Comment: Testing      | 65 - 99 mg/dL | EXTERNAL   |              |
| Fingerstick | performed at [facility];[address]    |               | LAB        |              |
|             | [address];[place]  |               |            |              |
|             | [id number]                   |               |            |              |
+-------------+-------------------------+---------------+------------+--------------+
 
 
 
+----------+
| Specimen |
+----------+
|          |
+----------+
 
 
 
+----------------+---------+--------------------+--------------+
| Performing     | Address | City/State/Zipcode | Phone Number |
| Organization   |         |                    |              |
+----------------+---------+--------------------+--------------+
|   EXTERNAL LAB |         |                    |              |
+----------------+---------+--------------------+--------------+
 POC Glucose (2015 11:36 AM PDT)
 
+-------------+-------------------------+---------------+------------+--------------+
| Component   | Value                   | Ref Range     | Performed  | Pathologist  |
|             |                         |               | At         | Signature    |
+-------------+-------------------------+---------------+------------+--------------+
| Glucose,    | 98Comment: Testing      | 65 - 99 mg/dL | EXTERNAL   |              |
| Fingerstick | performed at [facility];[address]    |               | LAB        |              |
|             | [address];[place], [place]  |               |            |              |
|             | [id number]                   |               |            |              |
+-------------+-------------------------+---------------+------------+--------------+
 
 
 
+----------+
| Specimen |
+----------+
|          |
+----------+
 
 
 
+----------------+---------+--------------------+--------------+
| Performing     | Address | City/State/Zipcode | Phone Number |
| Organization   |         |                    |              |
+----------------+---------+--------------------+--------------+
|   EXTERNAL LAB |         |                    |              |
+----------------+---------+--------------------+--------------+
 External Lab: CBC (2015  5:41 AM PDT)
 
+-------------+--------------------------+-----------------+------------+--------------+
| Component   | Value                    | Ref Range       | Performed  | Pathologist  |
|             |                          |                 | At         | Signature    |
+-------------+--------------------------+-----------------+------------+--------------+
| WBC         | 6.37Comment: Testing     | 3.80 - 11.00    | EXTERNAL   |              |
|             | performed at [facility], [address] | K/uL            | LAB        |              |
|             |  [address],        |                 |            |              |
|             | [place]  [id number]     |                 |            |              |
 
+-------------+--------------------------+-----------------+------------+--------------+
| Non-Fetal   | 4.66Comment: Testing     | 4.20 - 5.70     | EXTERNAL   |              |
| Red Blood   | performed at [facility], [address] | M/uL            | LAB        |              |
| Cells       |  [address],        |                 |            |              |
| Counted     | [place]  [id number]     |                 |            |              |
+-------------+--------------------------+-----------------+------------+--------------+
| Hemoglobin  | 13.9Comment: Testing     | 13.2 - 17.0     | EXTERNAL   |              |
|             | performed at [facility], [address] | g/dL            | LAB        |              |
|             |  [address],        |                 |            |              |
|             | [place]  [id number]     |                 |            |              |
+-------------+--------------------------+-----------------+------------+--------------+
| Hematocrit, | 42.8Comment: Testing     | 39.0 - 50.0 %   | EXTERNAL   |              |
|  POC        | performed at [facility], [address] |                 | LAB        |              |
|             |  [address],        |                 |            |              |
|             | [place]  [address]     |                 |            |              |
+-------------+--------------------------+-----------------+------------+--------------+
| MCV         | 91.8Comment: Testing     | 80.0 - 100.0 fl | EXTERNAL   |              |
|             | performed at [facility], [address] |                 | LAB        |              |
|             |  [address],        |                 |            |              |
|             | [place]  [id number]     |                 |            |              |
+-------------+--------------------------+-----------------+------------+--------------+
| MCH         | 29.9Comment: Testing     | 27.0 - 34.0 pg  | EXTERNAL   |              |
|             | performed at [facility], [address] |                 | LAB        |              |
|             |  [address],        |                 |            |              |
|             | [place]  [address]     |                 |            |              |
+-------------+--------------------------+-----------------+------------+--------------+
| MCHC        | 32.6Comment: Testing     | 32.0 - 35.5     | EXTERNAL   |              |
|             | performed at [facility], [address] | g/dL            | LAB        |              |
|             |  [address],        |                 |            |              |
|             | [place]  [address]     |                 |            |              |
+-------------+--------------------------+-----------------+------------+--------------+
| RDW-CV      | 40.3Comment: Testing     | 37 - 53 fl      | EXTERNAL   |              |
|             | performed at [facility], [address] |                 | LAB        |              |
|             |  [address],        |                 |            |              |
|             | [place]  [address]     |                 |            |              |
+-------------+--------------------------+-----------------+------------+--------------+
| Platelet    | 185Comment: Testing      | 150 - 400 K/uL  | EXTERNAL   |              |
| Count       | performed at [facility], [address] |                 | LAB        |              |
| Plasma      |  [address],        |                 |            |              |
|             | [place]  [address]     |                 |            |              |
+-------------+--------------------------+-----------------+------------+--------------+
| MPV         | 9.3Comment: Testing      | fl              | EXTERNAL   |              |
|             | performed at [facility], [address] |                 | LAB        |              |
|             |  [address],        |                 |            |              |
|             | [place]  [address]     |                 |            |              |
+-------------+--------------------------+-----------------+------------+--------------+
| Differentia | AUTOMATEDComment:        |                 | EXTERNAL   |              |
| l Type      | Testing performed at     |                 | LAB        |              |
|             | [facility], [address]   |                 |            |              |
|             | [place]      |                 |            |              |
|             | [id number]                    |                 |            |              |
+-------------+--------------------------+-----------------+------------+--------------+
| % Segmented | 64.16Comment: Testing    | %               | EXTERNAL   |              |
|             | performed at [facility], [address] |                 | LAB        |              |
| Neutrophils |  [address],        |                 |            |              |
|             | [place]  [address]     |                 |            |              |
+-------------+--------------------------+-----------------+------------+--------------+
| %           | 16.53Comment: Testing    | %               | EXTERNAL   |              |
| Lymphocytes | performed at [facility], [address] |                 | LAB        |              |
|             |  [address],        |                 |            |              |
 
|             | [place]  [address]     |                 |            |              |
+-------------+--------------------------+-----------------+------------+--------------+
| % Monocytes | 14.84Comment: Testing    | %               | EXTERNAL   |              |
|             | performed at [facility], [address] |                 | LAB        |              |
|             |  [address],        |                 |            |              |
|             | [place]  [address]     |                 |            |              |
+-------------+--------------------------+-----------------+------------+--------------+
| %           | 3.88Comment: Testing     | %               | EXTERNAL   |              |
| Eosinophils | performed at [facility], [address] |                 | LAB        |              |
|             |  [address],        |                 |            |              |
|             | [place]  [address]     |                 |            |              |
+-------------+--------------------------+-----------------+------------+--------------+
| % Basophils | 0.59Comment: Testing     | %               | EXTERNAL   |              |
|             | performed at [facility], [address] |                 | LAB        |              |
|             |  [address],        |                 |            |              |
|             | [place]  [address]     |                 |            |              |
+-------------+--------------------------+-----------------+------------+--------------+
| Absolute    | 4.09Comment: Testing     | 1.90 - 7.40     | EXTERNAL   |              |
| Segmented   | performed at [facility], [address] | K/uL            | LAB        |              |
| Neutrophils |  [address],        |                 |            |              |
|             | [place]  [address]     |                 |            |              |
+-------------+--------------------------+-----------------+------------+--------------+
| Absolute    | 1.05Comment: Testing     | 1.00 - 3.90     | EXTERNAL   |              |
| Lymphocytes | performed at [facility], [address] | K/uL            | LAB        |              |
|             |  [address],        |                 |            |              |
|             | [place]  [address]     |                 |            |              |
+-------------+--------------------------+-----------------+------------+--------------+
| Absolute    | 0.95 (H)Comment: Testing | 0.00 - 0.80     | EXTERNAL   |              |
| Monocytes   |  performed at [facility], [address]  | K/uL            | LAB        |              |
|             | [address],       |                 |            |              |
|             | [place]  [address]     |                 |            |              |
+-------------+--------------------------+-----------------+------------+--------------+
| Absolute    | 0.25Comment: Testing     | 0.00 - 0.50     | EXTERNAL   |              |
| Eosinophils | performed at [facility], [address] | K/uL            | LAB        |              |
|             |  [address],        |                 |            |              |
|             | [place]  [address]     |                 |            |              |
+-------------+--------------------------+-----------------+------------+--------------+
| Absolute    | 0.04Comment: Testing     | 0.00 - 0.10     | EXTERNAL   |              |
| Basophils   | performed at [facility], [address] | K/uL            | LAB        |              |
|             |  [address],        |                 |            |              |
|             | [place]  [address]     |                 |            |              |
+-------------+--------------------------+-----------------+------------+--------------+
 
 
 
+-----------------+
| Specimen        |
+-----------------+
| Blood specimen  |
| (specimen)      |
+-----------------+
 
 
 
+----------------+---------+--------------------+--------------+
| Performing     | Address | City/State/Zipcode | Phone Number |
| Organization   |         |                    |              |
+----------------+---------+--------------------+--------------+
|   EXTERNAL LAB |         |                    |              |
+----------------+---------+--------------------+--------------+
 
 Phosphorus (2015  5:41 AM PDT)
 
+------------+--------------------------+-----------------+------------+--------------+
| Component  | Value                    | Ref Range       | Performed  | Pathologist  |
|            |                          |                 | At         | Signature    |
+------------+--------------------------+-----------------+------------+--------------+
| PHOSPHORUS | 4.1Comment: Testing      | 2.3 - 4.8 mg/dL | EXTERNAL   |              |
|            | performed at [facility] [address] |                 | LAB        |              |
|            |  [address],        |                 |            |              |
|            | [place]  [id number]     |                 |            |              |
+------------+--------------------------+-----------------+------------+--------------+
 
 
 
+-----------------+
| Specimen        |
+-----------------+
| Blood specimen  |
| (specimen)      |
+-----------------+
 
 
 
+----------------+---------+--------------------+--------------+
| Performing     | Address | City/State/Zipcode | Phone Number |
| Organization   |         |                    |              |
+----------------+---------+--------------------+--------------+
|   EXTERNAL LAB |         |                    |              |
+----------------+---------+--------------------+--------------+
 Magnesium (2015  5:41 AM PDT)
 
+-----------+--------------------------+-----------------+------------+--------------+
| Component | Value                    | Ref Range       | Performed  | Pathologist  |
|           |                          |                 | At         | Signature    |
+-----------+--------------------------+-----------------+------------+--------------+
| Magnesium | 1.9Comment: Testing      | 1.7 - 2.4 mg/dL | EXTERNAL   |              |
|           | performed at [facility], [address] |                 | LAB        |              |
|           |  [address],        |                 |            |              |
|           | [place]  [id number]     |                 |            |              |
+-----------+--------------------------+-----------------+------------+--------------+
 
 
 
+-----------------+
| Specimen        |
+-----------------+
| Blood specimen  |
| (specimen)      |
+-----------------+
 
 
 
+----------------+---------+--------------------+--------------+
| Performing     | Address | City/State/Zipcode | Phone Number |
| Organization   |         |                    |              |
+----------------+---------+--------------------+--------------+
|   EXTERNAL LAB |         |                    |              |
+----------------+---------+--------------------+--------------+
 Comprehensive Metabolic Panel (2015  5:41 AM PDT)
 
 
+-------------+--------------------------+-----------------+------------+--------------+
| Component   | Value                    | Ref Range       | Performed  | Pathologist  |
|             |                          |                 | At         | Signature    |
+-------------+--------------------------+-----------------+------------+--------------+
| Na          | 134 (L)Comment: Testing  | 135 - 143       | EXTERNAL   |              |
|             | performed at [facility], [address] | mmol/L          | LAB        |              |
|             |  [address],        |                 |            |              |
|             | [place]  [id number]     |                 |            |              |
+-------------+--------------------------+-----------------+------------+--------------+
| K           | 3.7Comment: Testing      | 3.5 - 4.9       | EXTERNAL   |              |
|             | performed at [facility], [address] | mmol/L          | LAB        |              |
|             |  [address],        |                 |            |              |
|             | [place]  [id number]     |                 |            |              |
+-------------+--------------------------+-----------------+------------+--------------+
| Cl          | 99Comment: Testing       | 99 - 109 mmol/L | EXTERNAL   |              |
|             | performed at [facility], [address] |                 | LAB        |              |
|             |  [address],        |                 |            |              |
|             | [place]  [id number]     |                 |            |              |
+-------------+--------------------------+-----------------+------------+--------------+
| CO2         | 28Comment: Testing       | 23 - 32 mmol/L  | EXTERNAL   |              |
|             | performed at [facility], [address] |                 | LAB        |              |
|             |  [address],        |                 |            |              |
|             | [place]  [address]     |                 |            |              |
+-------------+--------------------------+-----------------+------------+--------------+
| Anion Gap   | 11Comment: Testing       | 5 - 20 mmol/L   | EXTERNAL   |              |
|             | performed at [facility], [address] |                 | LAB        |              |
|             |  [address],        |                 |            |              |
|             | [place]  [address]     |                 |            |              |
+-------------+--------------------------+-----------------+------------+--------------+
| Glucose,    | 103 (H)Comment: Testing  | 65 - 99 mg/dL   | EXTERNAL   |              |
| Fasting     | performed at [facility], [address] |                 | LAB        |              |
|             |  [address],        |                 |            |              |
|             | [place]  [id number]     |                 |            |              |
+-------------+--------------------------+-----------------+------------+--------------+
| BUN         | 5 (L)Comment: Testing    | 8 - 25 mg/dL    | EXTERNAL   |              |
|             | performed at [facility], [address] |                 | LAB        |              |
|             |  [address],        |                 |            |              |
|             | [place]  [address]     |                 |            |              |
+-------------+--------------------------+-----------------+------------+--------------+
| Creatinine  | 0.59 (L)Comment: Testing | 0.70 - 1.30     | EXTERNAL   |              |
|             |  performed at [facility], [address]  | mg/dL           | LAB        |              |
|             | [address],       |                 |            |              |
|             | [place]  [address]     |                 |            |              |
+-------------+--------------------------+-----------------+------------+--------------+
| BUN/Creatin | 8Comment: Testing        |                 | EXTERNAL   |              |
| ine Ratio   | performed at [facility], [address] |                 | LAB        |              |
|             |  [address],        |                 |            |              |
|             | [place]  [address]     |                 |            |              |
+-------------+--------------------------+-----------------+------------+--------------+
| Calcium     | 9.3Comment: Testing      | 8.5 - 10.5      | EXTERNAL   |              |
|             | performed at [facility], [address] | mg/dL           | LAB        |              |
|             |  [address],        |                 |            |              |
|             | [place]  [address]     |                 |            |              |
+-------------+--------------------------+-----------------+------------+--------------+
| Protein,    | 7.9Comment: Testing      | 6.3 - 8.2 g/dL  | EXTERNAL   |              |
| Total       | performed at [facility], [address] |                 | LAB        |              |
|             |  [address],        |                 |            |              |
|             | [place]  [address]     |                 |            |              |
+-------------+--------------------------+-----------------+------------+--------------+
| Albumin     | 4.1Comment: Testing      | 3.6 - 5.0 g/dL  | EXTERNAL   |              |
 
|             | performed at [facility], [address] |                 | LAB        |              |
|             |  [address],        |                 |            |              |
|             | [place]  [address]     |                 |            |              |
+-------------+--------------------------+-----------------+------------+--------------+
| Globulin    | 3.8Comment: Testing      | 1.3 - 4.9 g/dL  | EXTERNAL   |              |
|             | performed at [facility], [address] |                 | LAB        |              |
|             |  [address],        |                 |            |              |
|             | [place]  [id number]     |                 |            |              |
+-------------+--------------------------+-----------------+------------+--------------+
| A/G Ratio   | 1.1Comment: Testing      | 1.0 - 2.4       | EXTERNAL   |              |
|             | performed at [facility], [address] |                 | LAB        |              |
|             |  [address],        |                 |            |              |
|             | [place]  [address]     |                 |            |              |
+-------------+--------------------------+-----------------+------------+--------------+
| Bilirubin   | 0.9Comment: Testing      | 0.1 - 1.5 mg/dL | EXTERNAL   |              |
| Total       | performed at [facility], [address] |                 | LAB        |              |
|             |  [address],        |                 |            |              |
|             | [place]  [address]     |                 |            |              |
+-------------+--------------------------+-----------------+------------+--------------+
| ALP,        | 73Comment: Testing       | 35 - 115 U/L    | EXTERNAL   |              |
| External    | performed at [facility], [address] |                 | LAB        |              |
|             |  [address],        |                 |            |              |
|             | [place]  [address]     |                 |            |              |
+-------------+--------------------------+-----------------+------------+--------------+
| AST         | 30Comment: Testing       | 10 - 45 U/L     | EXTERNAL   |              |
|             | performed at [facility], [address] |                 | LAB        |              |
|             |  [address],        |                 |            |              |
|             | [place]  [address]     |                 |            |              |
+-------------+--------------------------+-----------------+------------+--------------+
| ALT         | 33Comment: Testing       | 10 - 65 U/L     | EXTERNAL   |              |
|             | performed at [facility] [address] |                 | LAB        |              |
|             |  [address],        |                 |            |              |
|             | [place]  [address]     |                 |            |              |
+-------------+--------------------------+-----------------+------------+--------------+
| Estimated   | >60Comment: GFR <60:     | mL/min/1.73m2   | EXTERNAL   |              |
| GFR         | CHRONIC KIDNEY DISEASE,  |                 | LAB        |              |
|             | IF FOUND OVER A 3 MONTH  |                 |            |              |
|             | PERIOD.GFR <15: KIDNEY   |                 |            |              |
|             | FAILURE.FOR       |                 |            |              |
|             | [employer], MULTIPLY THE  |                 |            |              |
|             | CALCULATED GFR BY        |                 |            |              |
|             | 1.210.Testing performed  |                 |            |              |
|             | at [facility], [address]           |                 |            |              |
|             | [address],         |                 |            |              |
|             | [place]   [id number]    |                 |            |              |
+-------------+--------------------------+-----------------+------------+--------------+
 
 
 
+-----------------+
| Specimen        |
+-----------------+
| Blood specimen  |
| (specimen)      |
+-----------------+
 
 
 
+----------------+---------+--------------------+--------------+
| Performing     | Address | City/State/Zipcode | Phone Number |
 
| Organization   |         |                    |              |
+----------------+---------+--------------------+--------------+
|   EXTERNAL LAB |         |                    |              |
+----------------+---------+--------------------+--------------+
 POC Glucose (2015  5:12 AM PDT)
 
+-------------+--------------------------+---------------+------------+--------------+
| Component   | Value                    | Ref Range     | Performed  | Pathologist  |
|             |                          |               | At         | Signature    |
+-------------+--------------------------+---------------+------------+--------------+
| Glucose,    | 101 (H)Comment: Testing  | 65 - 99 mg/dL | EXTERNAL   |              |
| Fingerstick | performed at [facility];[address]     |               | LAB        |              |
|             | [address];[place], [place]   |               |            |              |
|             | [id number]                    |               |            |              |
+-------------+--------------------------+---------------+------------+--------------+
 
 
 
+----------+
| Specimen |
+----------+
|          |
+----------+
 
 
 
+----------------+---------+--------------------+--------------+
| Performing     | Address | City/State/Zipcode | Phone Number |
| Organization   |         |                    |              |
+----------------+---------+--------------------+--------------+
|   EXTERNAL LAB |         |                    |              |
+----------------+---------+--------------------+--------------+
 POC Glucose (2015  9:14 PM PDT)
 
+-------------+--------------------------+---------------+------------+--------------+
| Component   | Value                    | Ref Range     | Performed  | Pathologist  |
|             |                          |               | At         | Signature    |
+-------------+--------------------------+---------------+------------+--------------+
| Glucose,    | 125 (H)Comment: Testing  | 65 - 99 mg/dL | EXTERNAL   |              |
| Fingerstick | performed at [facility];[address]     |               | LAB        |              |
|             | [address];[place]   |               |            |              |
|             | [id number]                    |               |            |              |
+-------------+--------------------------+---------------+------------+--------------+
 
 
 
+----------+
| Specimen |
+----------+
|          |
+----------+
 
 
 
+----------------+---------+--------------------+--------------+
| Performing     | Address | City/State/Zipcode | Phone Number |
| Organization   |         |                    |              |
+----------------+---------+--------------------+--------------+
|   EXTERNAL LAB |         |                    |              |
+----------------+---------+--------------------+--------------+
 
 POC Glucose (2015  4:07 PM PDT)
 
+-------------+--------------------------+---------------+------------+--------------+
| Component   | Value                    | Ref Range     | Performed  | Pathologist  |
|             |                          |               | At         | Signature    |
+-------------+--------------------------+---------------+------------+--------------+
| Glucose,    | 168 (H)Comment: Testing  | 65 - 99 mg/dL | EXTERNAL   |              |
| Fingerstick | performed at [facility];[address]     |               | LAB        |              |
|             | [address];[place]   |               |            |              |
|             | [id number]                    |               |            |              |
+-------------+--------------------------+---------------+------------+--------------+
 
 
 
+----------+
| Specimen |
+----------+
|          |
+----------+
 
 
 
+----------------+---------+--------------------+--------------+
| Performing     | Address | City/State/Zipcode | Phone Number |
| Organization   |         |                    |              |
+----------------+---------+--------------------+--------------+
|   EXTERNAL LAB |         |                    |              |
+----------------+---------+--------------------+--------------+
 External Lab: CBC (2015 11:20 AM PDT)
 
+-------------+--------------------------+-----------------+------------+--------------+
| Component   | Value                    | Ref Range       | Performed  | Pathologist  |
|             |                          |                 | At         | Signature    |
+-------------+--------------------------+-----------------+------------+--------------+
| WBC         | 5.90Comment: Testing     | 3.80 - 11.00    | EXTERNAL   |              |
|             | performed at [facility], [address] | K/uL            | LAB        |              |
|             |  [address],        |                 |            |              |
|             | [place]  [address]     |                 |            |              |
+-------------+--------------------------+-----------------+------------+--------------+
| Non-Fetal   | 4.61Comment: Testing     | 4.20 - 5.70     | EXTERNAL   |              |
| Red Blood   | performed at [facility], [address] | M/uL            | LAB        |              |
| Cells       |  [address],        |                 |            |              |
| Counted     | [place]  [id number]     |                 |            |              |
+-------------+--------------------------+-----------------+------------+--------------+
| Hemoglobin  | 13.9Comment: Testing     | 13.2 - 17.0     | EXTERNAL   |              |
|             | performed at [facility], [address] | g/dL            | LAB        |              |
|             |  [address],        |                 |            |              |
|             | [place]  [address]     |                 |            |              |
+-------------+--------------------------+-----------------+------------+--------------+
| Hematocrit, | 42.1Comment: Testing     | 39.0 - 50.0 %   | EXTERNAL   |              |
|  POC        | performed at [facility], [address] |                 | LAB        |              |
|             |  [address],        |                 |            |              |
|             | [place]  [address]     |                 |            |              |
+-------------+--------------------------+-----------------+------------+--------------+
| MCV         | 91.4Comment: Testing     | 80.0 - 100.0 fl | EXTERNAL   |              |
|             | performed at [facility], [address] |                 | LAB        |              |
|             |  [address],        |                 |            |              |
|             | [place]  [address]     |                 |            |              |
+-------------+--------------------------+-----------------+------------+--------------+
| MCH         | 30.2Comment: Testing     | 27.0 - 34.0 pg  | EXTERNAL   |              |
 
|             | performed at [facility], [address] |                 | LAB        |              |
|             |  [address],        |                 |            |              |
|             | [place]  [address]     |                 |            |              |
+-------------+--------------------------+-----------------+------------+--------------+
| MCHC        | 33.0Comment: Testing     | 32.0 - 35.5     | EXTERNAL   |              |
|             | performed at [facility], [address] | g/dL            | LAB        |              |
|             |  [address],        |                 |            |              |
|             | [place]  [address]     |                 |            |              |
+-------------+--------------------------+-----------------+------------+--------------+
| RDW-CV      | 40.3Comment: Testing     | 37 - 53 fl      | EXTERNAL   |              |
|             | performed at [facility], [address] |                 | LAB        |              |
|             |  [address],        |                 |            |              |
|             | [place]  [address]     |                 |            |              |
+-------------+--------------------------+-----------------+------------+--------------+
| Platelet    | 179Comment: Testing      | 150 - 400 K/uL  | EXTERNAL   |              |
| Count       | performed at [facility], [address] |                 | LAB        |              |
| Plasma      |  [address],        |                 |            |              |
|             | [place]  [address]     |                 |            |              |
+-------------+--------------------------+-----------------+------------+--------------+
| MPV         | 9.1Comment: Testing      | fl              | EXTERNAL   |              |
|             | performed at [facility], [address] |                 | LAB        |              |
|             |  [address],        |                 |            |              |
|             | [place]  [address]     |                 |            |              |
+-------------+--------------------------+-----------------+------------+--------------+
| Differentia | AUTOMATEDComment:        |                 | EXTERNAL   |              |
| l Type      | Testing performed at     |                 | LAB        |              |
|             | [facility], [address]   |                 |            |              |
|             | [place]      |                 |            |              |
|             | [id number]                    |                 |            |              |
+-------------+--------------------------+-----------------+------------+--------------+
| % Segmented | 66.11Comment: Testing    | %               | EXTERNAL   |              |
|             | performed at [facility], [address] |                 | LAB        |              |
| Neutrophils |  [address],        |                 |            |              |
|             | [place]  [address]     |                 |            |              |
+-------------+--------------------------+-----------------+------------+--------------+
| %           | 10.48Comment: Testing    | %               | EXTERNAL   |              |
| Lymphocytes | performed at [facility], [address] |                 | LAB        |              |
|             |  [address],        |                 |            |              |
|             | [place]  [address]     |                 |            |              |
+-------------+--------------------------+-----------------+------------+--------------+
| % Monocytes | 17.73Comment: Testing    | %               | EXTERNAL   |              |
|             | performed at [facility], [address] |                 | LAB        |              |
|             |  [address],        |                 |            |              |
|             | [place]  [address]     |                 |            |              |
+-------------+--------------------------+-----------------+------------+--------------+
| %           | 4.21Comment: Testing     | %               | EXTERNAL   |              |
| Eosinophils | performed at [facility], [address] |                 | LAB        |              |
|             |  [address],        |                 |            |              |
|             | [place]  [address]     |                 |            |              |
+-------------+--------------------------+-----------------+------------+--------------+
| % Basophils | 1.47Comment: Testing     | %               | EXTERNAL   |              |
|             | performed at [facility], [address] |                 | LAB        |              |
|             |  [address],        |                 |            |              |
|             | [place]  [id number]     |                 |            |              |
+-------------+--------------------------+-----------------+------------+--------------+
| Absolute    | 3.90Comment: Testing     | 1.90 - 7.40     | EXTERNAL   |              |
| Segmented   | performed at [facility], [address] | K/uL            | LAB        |              |
| Neutrophils |  [address],        |                 |            |              |
|             | [place]  [id number]     |                 |            |              |
+-------------+--------------------------+-----------------+------------+--------------+
 
| Absolute    | 0.62 (L)Comment: Testing | 1.00 - 3.90     | EXTERNAL   |              |
| Lymphocytes |  performed at [facility], [address]  | K/uL            | LAB        |              |
|             | [address],       |                 |            |              |
|             | [place]  [address]     |                 |            |              |
+-------------+--------------------------+-----------------+------------+--------------+
| Absolute    | 1.05 (H)Comment: Testing | 0.00 - 0.80     | EXTERNAL   |              |
| Monocytes   |  performed at [facility], [address]  | K/uL            | LAB        |              |
|             | [address],       |                 |            |              |
|             | [place]  [id number]     |                 |            |              |
+-------------+--------------------------+-----------------+------------+--------------+
| Absolute    | 0.25Comment: Testing     | 0.00 - 0.50     | EXTERNAL   |              |
| Eosinophils | performed at [facility], [address] | K/uL            | LAB        |              |
|             |  [address],        |                 |            |              |
|             | [place]  [address]     |                 |            |              |
+-------------+--------------------------+-----------------+------------+--------------+
| Absolute    | 0.09Comment: Testing     | 0.00 - 0.10     | EXTERNAL   |              |
| Basophils   | performed at [facility], [address] | K/uL            | LAB        |              |
|             |  [address],        |                 |            |              |
|             | [place]  [address]     |                 |            |              |
+-------------+--------------------------+-----------------+------------+--------------+
 
 
 
+-----------------+
| Specimen        |
+-----------------+
| Blood specimen  |
| (specimen)      |
+-----------------+
 
 
 
+----------------+---------+--------------------+--------------+
| Performing     | Address | City/State/Zipcode | Phone Number |
| Organization   |         |                    |              |
+----------------+---------+--------------------+--------------+
|   EXTERNAL LAB |         |                    |              |
+----------------+---------+--------------------+--------------+
 POC Glucose (2015 11:20 AM PDT)
 
+-------------+-------------------------+---------------+------------+--------------+
| Component   | Value                   | Ref Range     | Performed  | Pathologist  |
|             |                         |               | At         | Signature    |
+-------------+-------------------------+---------------+------------+--------------+
| Glucose,    | 91Comment: Testing      | 65 - 99 mg/dL | EXTERNAL   |              |
| Fingerstick | performed at [facility];[address]    |               | LAB        |              |
|             | [address];[place]  |               |            |              |
|             | [id number]                   |               |            |              |
+-------------+-------------------------+---------------+------------+--------------+
 
 
 
+----------+
| Specimen |
+----------+
|          |
+----------+
 
 
 
 
+----------------+---------+--------------------+--------------+
| Performing     | Address | City/State/Zipcode | Phone Number |
| Organization   |         |                    |              |
+----------------+---------+--------------------+--------------+
|   EXTERNAL LAB |         |                    |              |
+----------------+---------+--------------------+--------------+
 Phosphorus (2015 11:20 AM PDT)
 
+------------+--------------------------+-----------------+------------+--------------+
| Component  | Value                    | Ref Range       | Performed  | Pathologist  |
|            |                          |                 | At         | Signature    |
+------------+--------------------------+-----------------+------------+--------------+
| PHOSPHORUS | 4.2Comment: Testing      | 2.3 - 4.8 mg/dL | EXTERNAL   |              |
|            | performed at [facility], [address] |                 | LAB        |              |
|            |  [address],        |                 |            |              |
|            | [place]  [id number]     |                 |            |              |
+------------+--------------------------+-----------------+------------+--------------+
 
 
 
+-----------------+
| Specimen        |
+-----------------+
| Blood specimen  |
| (specimen)      |
+-----------------+
 
 
 
+----------------+---------+--------------------+--------------+
| Performing     | Address | City/State/Zipcode | Phone Number |
| Organization   |         |                    |              |
+----------------+---------+--------------------+--------------+
|   EXTERNAL LAB |         |                    |              |
+----------------+---------+--------------------+--------------+
 Magnesium (2015 11:20 AM PDT)
 
+-----------+--------------------------+-----------------+------------+--------------+
| Component | Value                    | Ref Range       | Performed  | Pathologist  |
|           |                          |                 | At         | Signature    |
+-----------+--------------------------+-----------------+------------+--------------+
| Magnesium | 2.0Comment: Testing      | 1.7 - 2.4 mg/dL | EXTERNAL   |              |
|           | performed at [facility], [address] |                 | LAB        |              |
|           |  [address],        |                 |            |              |
|           | [place]  [address]     |                 |            |              |
+-----------+--------------------------+-----------------+------------+--------------+
 
 
 
+-----------------+
| Specimen        |
+-----------------+
| Blood specimen  |
| (specimen)      |
+-----------------+
 
 
 
+----------------+---------+--------------------+--------------+
| Performing     | Address | City/State/Zipcode | Phone Number |
 
| Organization   |         |                    |              |
+----------------+---------+--------------------+--------------+
|   EXTERNAL LAB |         |                    |              |
+----------------+---------+--------------------+--------------+
 Comprehensive Metabolic Panel (2015 11:20 AM PDT)
 
+-------------+--------------------------+-----------------+------------+--------------+
| Component   | Value                    | Ref Range       | Performed  | Pathologist  |
|             |                          |                 | At         | Signature    |
+-------------+--------------------------+-----------------+------------+--------------+
| Na          | 134 (L)Comment: Testing  | 135 - 143       | EXTERNAL   |              |
|             | performed at [facility], [address] | mmol/L          | LAB        |              |
|             |  [address],        |                 |            |              |
|             | [place]  [address]     |                 |            |              |
+-------------+--------------------------+-----------------+------------+--------------+
| K           | 3.5Comment: Testing      | 3.5 - 4.9       | EXTERNAL   |              |
|             | performed at [facility], [address] | mmol/L          | LAB        |              |
|             |  [address],        |                 |            |              |
|             | [place]  [address]     |                 |            |              |
+-------------+--------------------------+-----------------+------------+--------------+
| Cl          | 100Comment: Testing      | 99 - 109 mmol/L | EXTERNAL   |              |
|             | performed at [facility], [address] |                 | LAB        |              |
|             |  [address],        |                 |            |              |
|             | [place]  [address]     |                 |            |              |
+-------------+--------------------------+-----------------+------------+--------------+
| CO2         | 28Comment: Testing       | 23 - 32 mmol/L  | EXTERNAL   |              |
|             | performed at [facility], [address] |                 | LAB        |              |
|             |  [address],        |                 |            |              |
|             | [place]  [address]     |                 |            |              |
+-------------+--------------------------+-----------------+------------+--------------+
| Anion Gap   | 10Comment: Testing       | 5 - 20 mmol/L   | EXTERNAL   |              |
|             | performed at [facility], [address] |                 | LAB        |              |
|             |  [address],        |                 |            |              |
|             | [place]  [address]     |                 |            |              |
+-------------+--------------------------+-----------------+------------+--------------+
| Glucose,    | 102 (H)Comment: Testing  | 65 - 99 mg/dL   | EXTERNAL   |              |
| Fasting     | performed at [facility], [address] |                 | LAB        |              |
|             |  [address],        |                 |            |              |
|             | [place]  [address]     |                 |            |              |
+-------------+--------------------------+-----------------+------------+--------------+
| BUN         | 6 (L)Comment: Testing    | 8 - 25 mg/dL    | EXTERNAL   |              |
|             | performed at [facility], [address] |                 | LAB        |              |
|             |  [address],        |                 |            |              |
|             | [place]  [address]     |                 |            |              |
+-------------+--------------------------+-----------------+------------+--------------+
| Creatinine  | 0.60 (L)Comment: Testing | 0.70 - 1.30     | EXTERNAL   |              |
|             |  performed at [facility], [address]  | mg/dL           | LAB        |              |
|             | [address],       |                 |            |              |
|             | [place]  [address]     |                 |            |              |
+-------------+--------------------------+-----------------+------------+--------------+
| BUN/Creatin | 10Comment: Testing       |                 | EXTERNAL   |              |
| ine Ratio   | performed at [facility], [address] |                 | LAB        |              |
|             |  [address],        |                 |            |              |
|             | [place]  [address]     |                 |            |              |
+-------------+--------------------------+-----------------+------------+--------------+
| Calcium     | 9.1Comment: Testing      | 8.5 - 10.5      | EXTERNAL   |              |
|             | performed at [facility], [address] | mg/dL           | LAB        |              |
|             |  [address],        |                 |            |              |
|             | [place]  [address]     |                 |            |              |
+-------------+--------------------------+-----------------+------------+--------------+
 
| Protein,    | 7.7Comment: Testing      | 6.3 - 8.2 g/dL  | EXTERNAL   |              |
| Total       | performed at [facility], [address] |                 | LAB        |              |
|             |  [address],        |                 |            |              |
|             | [place]  [address]     |                 |            |              |
+-------------+--------------------------+-----------------+------------+--------------+
| Albumin     | 4.1Comment: Testing      | 3.6 - 5.0 g/dL  | EXTERNAL   |              |
|             | performed at [facility], [address] |                 | LAB        |              |
|             |  [address],        |                 |            |              |
|             | [place]  [address]     |                 |            |              |
+-------------+--------------------------+-----------------+------------+--------------+
| Globulin    | 3.6Comment: Testing      | 1.3 - 4.9 g/dL  | EXTERNAL   |              |
|             | performed at [facility], [address] |                 | LAB        |              |
|             |  [address],        |                 |            |              |
|             | [place]  [address]     |                 |            |              |
+-------------+--------------------------+-----------------+------------+--------------+
| A/G Ratio   | 1.1Comment: Testing      | 1.0 - 2.4       | EXTERNAL   |              |
|             | performed at [facility] [address] |                 | LAB        |              |
|             |  [address],        |                 |            |              |
|             | [place]  [address]     |                 |            |              |
+-------------+--------------------------+-----------------+------------+--------------+
| Bilirubin   | 0.8Comment: Testing      | 0.1 - 1.5 mg/dL | EXTERNAL   |              |
| Total       | performed at [facility], [address] |                 | LAB        |              |
|             |  [address],        |                 |            |              |
|             | [place]  [address]     |                 |            |              |
+-------------+--------------------------+-----------------+------------+--------------+
| ALP,        | 71Comment: Testing       | 35 - 115 U/L    | EXTERNAL   |              |
| External    | performed at [facility], [address] |                 | LAB        |              |
|             |  [address],        |                 |            |              |
|             | [place]  [address]     |                 |            |              |
+-------------+--------------------------+-----------------+------------+--------------+
| AST         | 29Comment: Testing       | 10 - 45 U/L     | EXTERNAL   |              |
|             | performed at [facility], [address] |                 | LAB        |              |
|             |  [address],        |                 |            |              |
|             | [place]  [address]     |                 |            |              |
+-------------+--------------------------+-----------------+------------+--------------+
| ALT         | 31Comment: Testing       | 10 - 65 U/L     | EXTERNAL   |              |
|             | performed at [facility], [address] |                 | LAB        |              |
|             |  [address],        |                 |            |              |
|             | [place]  [address]     |                 |            |              |
+-------------+--------------------------+-----------------+------------+--------------+
| Estimated   | >60Comment: GFR <60:     | mL/min/1.73m2   | EXTERNAL   |              |
| GFR         | CHRONIC KIDNEY DISEASE,  |                 | LAB        |              |
|             | IF FOUND OVER A 3 MONTH  |                 |            |              |
|             | PERIOD.GFR <15: KIDNEY   |                 |            |              |
|             | FAILURE.FOR       |                 |            |              |
|             | [employer], MULTIPLY THE  |                 |            |              |
|             | CALCULATED GFR BY        |                 |            |              |
|             | 1.210.Testing performed  |                 |            |              |
|             | at [facility], [address]           |                 |            |              |
|             | [address],         |                 |            |              |
|             | [place]   [address]    |                 |            |              |
+-------------+--------------------------+-----------------+------------+--------------+
 
 
 
+-----------------+
| Specimen        |
+-----------------+
| Blood specimen  |
| (specimen)      |
 
+-----------------+
 
 
 
+----------------+---------+--------------------+--------------+
| Performing     | Address | City/State/Zipcode | Phone Number |
| Organization   |         |                    |              |
+----------------+---------+--------------------+--------------+
|   EXTERNAL LAB |         |                    |              |
+----------------+---------+--------------------+--------------+
 TSH (2015  5:38 AM PDT)
 
+-----------+--------------------------+--------------+------------+--------------+
| Component | Value                    | Ref Range    | Performed  | Pathologist  |
|           |                          |              | At         | Signature    |
+-----------+--------------------------+--------------+------------+--------------+
| TSH       | 3.25Comment: Testing     | 0.45 - 5.10  | EXTERNAL   |              |
|           | performed at [facility], [address] | uIU/mL       | LAB        |              |
|           |  [address],        |              |            |              |
|           | [place]  [address]     |              |            |              |
+-----------+--------------------------+--------------+------------+--------------+
 
 
 
+-----------------+
| Specimen        |
+-----------------+
| Blood specimen  |
| (specimen)      |
+-----------------+
 
 
 
+----------------+---------+--------------------+--------------+
| Performing     | Address | City/State/Zipcode | Phone Number |
| Organization   |         |                    |              |
+----------------+---------+--------------------+--------------+
|   EXTERNAL LAB |         |                    |              |
+----------------+---------+--------------------+--------------+
 Lipid Panel (2015  5:38 AM PDT)
 
+-------------+--------------------------+-----------+------------+--------------+
| Component   | Value                    | Ref Range | Performed  | Pathologist  |
|             |                          |           | At         | Signature    |
+-------------+--------------------------+-----------+------------+--------------+
| Cholesterol | 206 (H)Comment: Testing  | mg/dL     | EXTERNAL   |              |
|             | performed at [facility], [address] |           | LAB        |              |
|             |  [address],        |           |            |              |
|             | [place]  [address]     |           |            |              |
+-------------+--------------------------+-----------+------------+--------------+
| Triglycerid | 49Comment: Testing       | mg/dL     | EXTERNAL   |              |
| es          | performed at [facility], [address] |           | LAB        |              |
|             |  [address],        |           |            |              |
|             | [place]  [address]     |           |            |              |
+-------------+--------------------------+-----------+------------+--------------+
| HDL         | 87Comment: Testing       | mg/dL     | EXTERNAL   |              |
|             | performed at [facility], [address] |           | LAB        |              |
|             |  [address],        |           |            |              |
|             | [place]  [address]     |           |            |              |
+-------------+--------------------------+-----------+------------+--------------+
 
| LDL,        | 109 (H)Comment: Testing  | mg/dL     | EXTERNAL   |              |
| Calculated  | performed at [facility], [address] |           | LAB        |              |
|             |  [address],        |           |            |              |
|             | [place]  [address]     |           |            |              |
+-------------+--------------------------+-----------+------------+--------------+
 
 
 
+-----------------+
| Specimen        |
+-----------------+
| Blood specimen  |
| (specimen)      |
+-----------------+
 
 
 
+----------------+---------+--------------------+--------------+
| Performing     | Address | City/State/Zipcode | Phone Number |
| Organization   |         |                    |              |
+----------------+---------+--------------------+--------------+
|   EXTERNAL LAB |         |                    |              |
+----------------+---------+--------------------+--------------+
 POC Glucose (2015  5:13 AM PDT)
 
+-------------+--------------------------+---------------+------------+--------------+
| Component   | Value                    | Ref Range     | Performed  | Pathologist  |
|             |                          |               | At         | Signature    |
+-------------+--------------------------+---------------+------------+--------------+
| Glucose,    | 169 (H)Comment: Testing  | 65 - 99 mg/dL | EXTERNAL   |              |
| Fingerstick | performed at [facility];[address]     |               | LAB        |              |
|             | [address];[place], [place]   |               |            |              |
|             | [address]                    |               |            |              |
+-------------+--------------------------+---------------+------------+--------------+
 
 
 
+----------+
| Specimen |
+----------+
|          |
+----------+
 
 
 
+----------------+---------+--------------------+--------------+
| Performing     | Address | City/State/Zipcode | Phone Number |
| Organization   |         |                    |              |
+----------------+---------+--------------------+--------------+
|   EXTERNAL LAB |         |                    |              |
+----------------+---------+--------------------+--------------+
 POC Glucose (2015  4:33 AM PDT)
 
+-------------+-------------------------+---------------+------------+--------------+
| Component   | Value                   | Ref Range     | Performed  | Pathologist  |
|             |                         |               | At         | Signature    |
+-------------+-------------------------+---------------+------------+--------------+
| Glucose,    | 88Comment: Testing      | 65 - 99 mg/dL | EXTERNAL   |              |
| Fingerstick | performed at [facility];[address]    |               | LAB        |              |
|             | [address];[place], [place]  |               |            |              |
 
|             | [id number]                   |               |            |              |
+-------------+-------------------------+---------------+------------+--------------+
 
 
 
+----------+
| Specimen |
+----------+
|          |
+----------+
 
 
 
+----------------+---------+--------------------+--------------+
| Performing     | Address | City/State/Zipcode | Phone Number |
| Organization   |         |                    |              |
+----------------+---------+--------------------+--------------+
|   EXTERNAL LAB |         |                    |              |
+----------------+---------+--------------------+--------------+
 Ethanol (2015  3:06 AM PDT)
 
+-----------+-------------------------+-----------+------------+--------------+
| Component | Value                   | Ref Range | Performed  | Pathologist  |
|           |                         |           | At         | Signature    |
+-----------+-------------------------+-----------+------------+--------------+
| Ethyl     | <10Comment: Testing     | mg/dL     | EXTERNAL   |              |
| Alcohol   | performed at [facility];[address]    |           | LAB        |              |
|           | [address];[place], [place]  |           |            |              |
|           | [address]                   |           |            |              |
+-----------+-------------------------+-----------+------------+--------------+
 
 
 
+-----------------+
| Specimen        |
+-----------------+
| Blood specimen  |
| (specimen)      |
+-----------------+
 
 
 
+----------------+---------+--------------------+--------------+
| Performing     | Address | City/State/Zipcode | Phone Number |
| Organization   |         |                    |              |
+----------------+---------+--------------------+--------------+
|   EXTERNAL LAB |         |                    |              |
+----------------+---------+--------------------+--------------+
 POC Glucose (2015  3:01 AM PDT)
 
+-------------+-------------------------+---------------+------------+--------------+
| Component   | Value                   | Ref Range     | Performed  | Pathologist  |
|             |                         |               | At         | Signature    |
+-------------+-------------------------+---------------+------------+--------------+
| Glucose,    | 75Comment: Testing      | 65 - 99 mg/dL | EXTERNAL   |              |
| Fingerstick | performed at [facility];[address]    |               | LAB        |              |
|             | [address];[place], [place]  |               |            |              |
|             | [address]                   |               |            |              |
+-------------+-------------------------+---------------+------------+--------------+
 
 
 
 
+----------+
| Specimen |
+----------+
|          |
+----------+
 
 
 
+----------------+---------+--------------------+--------------+
| Performing     | Address | City/State/Zipcode | Phone Number |
| Organization   |         |                    |              |
+----------------+---------+--------------------+--------------+
|   EXTERNAL LAB |         |                    |              |
+----------------+---------+--------------------+--------------+
 documented in this encounter
 
 Visit Diagnoses
 
 
+----------------------------------------------------------------------------------------+
| Diagnosis                                                                              |
+----------------------------------------------------------------------------------------+
|   Nausea and vomiting in adult  Nausea with vomiting                                   |
+----------------------------------------------------------------------------------------+
|   Alcohol withdrawal, uncomplicated (HCC)                                              |
+----------------------------------------------------------------------------------------+
|   Elevated LDH  Nonspecific elevation of levels of transaminase or lactic acid         |
| dehydrogenase (LDH)                                                                    |
+----------------------------------------------------------------------------------------+
|   H/O diabetes mellitus  Personal history of other endocrine, metabolic, and immunity  |
| disorders                                                                              |
+----------------------------------------------------------------------------------------+
|   Elevated blood pressure  Elevated blood pressure reading without diagnosis of        |
| hypertension                                                                           |
+----------------------------------------------------------------------------------------+
|   Alcohol withdrawal, with unspecified complication (HCC)                              |
+----------------------------------------------------------------------------------------+
|   Smoker  Tobacco use disorder                                                         |
+----------------------------------------------------------------------------------------+
 documented in this encounter
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
## 2020-07-20 NOTE — EKG
[facility]
                                    [address] [place], [place]  [address]
_________________________________________________________________________________________
                                                                 Signed   
 
 
Sinus tachycardia
Otherwise normal ECG
When compared with ECG of 17-JUL-2020 14:08,
No significant change was found
Confirmed by ARIES LUX MD ([id number]) on 7/20/2020 10:27:38 AM
 
 
 
 
 
 
 
 
 
 
 
 
 
 
 
 
 
 
 
 
 
 
 
 
 
 
 
 
 
 
 
 
 
 
 
 
 
 
 
 
    Electronically Signed By: ARIES LUX MD  07/20/20 1027
_________________________________________________________________________________________
PATIENT NAME:     ANALIA GOMEZ               
MEDICAL RECORD #: [id number]                     Electrocardiogram             
          ACCT #: [id number]  
DATE OF BIRTH:   [birth year]                                       
PHYSICIAN:   ARIES LUX MD                   REPORT #: [id number]
REPORT IS CONFIDENTIAL AND NOT TO BE RELEASED WITHOUT AUTHORIZATION

## 2020-08-08 NOTE — XMS
Encounter Summary
  Created on: 2020
 
 Pramod Arnold
 External Reference #: [id number]
 : 71
 Sex: Male
 
 Demographics
 
 
+-----------------------+--------------------------+
| Address               | [address] |
|                       | [place]  [address]     |
+-----------------------+--------------------------+
| Preferred Language    | Unknown                  |
+-----------------------+--------------------------+
| Marital Status        | Single                   |
+-----------------------+--------------------------+
| Bahai Affiliation | Unknown                  |
+-----------------------+--------------------------+
| Race                  | Unknown                  |
+-----------------------+--------------------------+
| Ethnic Group          | Unknown                  |
+-----------------------+--------------------------+
 
 
 Author
 
 
+--------------+--------------------------------------------+
| Author       | [facility]  |
|              | and [facility]                                |
+--------------+--------------------------------------------+
| Organization | [facility]  |
|              | and [place]                                |
+--------------+--------------------------------------------+
| Address      | Unknown                                    |
+--------------+--------------------------------------------+
| Phone        | Unavailable                                |
+--------------+--------------------------------------------+
 
 
 
 Care Team Providers
 
 
+-----------------------+------+-------------+
| Care Team Member Name | Role | Phone       |
+-----------------------+------+-------------+
 PCP  | Unavailable |
+-----------------------+------+-------------+
 
 
 
 Encounter Details
 
 
 
+--------+-----------+----------------------+----------------------+----------------------+
| Date   | Type      | Department           | Care Team            | Description          |
+--------+-----------+----------------------+----------------------+----------------------+
| / | Hospital  |   [facility]    |   Roxana Garner | Nausea and vomiting  |
| 2015 - | Encounter | [facility]       |  MD [address]     | in adult; Alcohol    |
|        |           | CLINICAL DECISION    | [address]           | withdrawal,          |
| / |           | [address]IT  [address] | [place] [address]   | uncomplicated (HCC); |
|    |           |   [place]       | [phone]         |  Elevated LDH; H/O   |
|        |           | [address]           | [phone] (Fax)   | diabetes mellitus;   |
|        |           | [phone]         |                      | Elevated blood       |
|        |           |                      |                      | pressure; Alcohol    |
|        |           |                      |                      | withdrawal, with     |
|        |           |                      |                      | unspecified          |
|        |           |                      |                      | complication (HCC);  |
|        |           |                      |                      | Smoker               |
+--------+-----------+----------------------+----------------------+----------------------+
 
 
 
 Social History
 
 
+-------------------+-------+-----------+--------+------+
| Tobacco Use       | Types | Packs/Day | Years  | Date |
|                   |       |           | Used   |      |
+-------------------+-------+-----------+--------+------+
| Current Some Day  |       |           |        |      |
| Smoker            |       |           |        |      |
+-------------------+-------+-----------+--------+------+
 
 
 
+------------------+---------------+
| Sex Assigned at  | Date Recorded |
| Birth            |               |
+------------------+---------------+
| Not on file      |               |
+------------------+---------------+
 documented as of this encounter
 
 Discharge Summaries
 Efrain Montoya MD - 2015  5:29 PM PDTFormatting of this note might be different from th
e original.
 Discharge Summaries by Efrain Montoya MD at 07/07/15 1729  
  Author:  Efrain Montoya MD Service:  Hospitalist Author Type:  Physician 
  Filed:  07/07/15 1733 Date of Service:  07/07/15 1729 Status:  Signed 
  :  Efrain Montoya MD (Physician)   
   
 [facility]
 Service:  Hospitalist
 Discharge Summary
 
 Date of Admission:  2015
 Date of Discharge:  2015
 
 Discharge Physician:  Efrain Montoya MD
 Treatment Team:
  Admitting Provider: Roxana Garner MD
 Discharge Diagnoses:   
 
 
 Principal Problem:
   Alcohol withdrawal (HCC)
 Active Problems:
   Elevated blood pressure
   H/O diabetes mellitus
   Nausea and vomiting in adult
   Smoker
 Resolved Problems:
   * No resolved hospital problems. *
 
 Procedures:  * No surgery found *
 
 Significant Diagnostic Studies:  
 
 BRIEF HISTORY OF PRESENTATION:  
      Pramod Arnold is a 39 y.o. male who presented with N/V and alcohol withdrawal please
 refer to H&P for details 
 HOSPITAL COURSE:  
 1. Acute alcohol withdrawal,uncomplictaed with DTs,conitnue clonidine,he reports he has thi
amine and folic acid at home,he is counseled about alcohol abstinence and also patient couns
eled for alcohol rehabilitation on discharge.
 
 2. Nausea and vomiting likely from alcohol withdrawal resolved
 
 3. Diabetes type 2 insulin as home regimen 
 
 4. Hypertension continue clonidine and check BP at home follow up with PCP
 
 5. Active smoking. Counseled about smoking cessation.he will think about it 
 
 He was AXOX3,all his questions addressed and he was discharged in a stable condition covere
d with side effects of newly medications and especially warned about the reaction of alcohol
 and ativan which could be deadly.
 Past Medical History   
 Diagnosis  Date 
   Diabetes mellitus, type 2 (HCC)  
   Hypertension  
   Alcoholism (HCC)  
 
 Past Surgical History     
 Procedure   Laterality Date 
   Back surgery    
   low back surgery as infant    
   Spine surgery    
 
 Allergies   
 Allergen  Reactions 
   Acetaminophen Anaphylaxis 
   Penicillins Anaphylaxis 
 
 No prescriptions prior to admission 
 
 DISCHARGE EXAM
 Vital Signs:
 /77 | Pulse 86 | Temp(Src) 97.9 F (36.6 C) (Oral) | Resp 20 | Ht 1.727 m (5' 8") 
| Wt 84.7 kg (186 lb 11.7 oz) | BMI 28.40 kg/m2 | SpO2 97%
 
 General appearance: alert, appears stated age and cooperative
 Head: Normocephalic, without obvious abnormality, atraumatic
 Neck: no adenopathy, no carotid bruit, no JVD, supple, symmetrical, trachea midline and thy
 
roid not enlarged, symmetric, no tenderness/mass/nodules
 Lungs: clear to auscultation bilaterally
 Heart: regular rate and rhythm, S1, S2 normal, no murmur, click, rub or gallop
 Abdomen: soft, non-tender; bowel sounds normal; no masses,  no organomegaly
 Extremities: extremities normal, atraumatic, no cyanosis or edema
 Pulses: 2+ and symmetric
 Neurologic: Grossly normal AXOX3
 
 DATA
 
 CBC:  
 Lab Results    
 Component  Value Date 
  WBC 6.37 2015 
  RBC 4.66 2015 
  HGB 13.9 2015 
  HCT 42.8 2015 
  MCV 91.8 2015 
  MCH 29.9 2015 
  MCHC 32.6 2015 
  RDW 40.3 2015 
   2015 
  MPV 9.3 2015 
  DIFFTYPE AUTOMATED 2015 
 
 CMP:  
 Lab Results    
 Component  Value Date 
  * 2015 
  K 3.7 2015 
  CL 99 2015 
  CO2 28 2015 
  ANIONGAP 11 2015 
  GLUF 103* 2015 
  BUN 5* 2015 
  CREATININE 0.59* 2015 
  BCR 8 2015 
  CA 9.3 2015 
  PROT 7.9 2015 
  ALB 4.1 2015 
  GLOB 3.8 2015 
  BILITOT 0.9 2015 
  ALP 73 2015 
  AST 30 2015 
  ALT 33 2015 
  EGFR >60 2015 
 
 Disposition:  Home
 Condition:  Stable
 Code Status:  Prior
 
 Discharge Instructions
 Diet Diabetic 
 
 Diet Cardiac 
 
 Activity as Tolerated 
 
 Call MD for: Temperature > 100.4F (38C) 
 
 
 Call MD for:  Persistant Nausea and Vomiting 
 
 Call MD for:  Severe Uncontrolled Pain 
 
 Call MD for:  Redness, Tenderness, or Signs of Infection (Pain, Swelling, Redness, Odor or 
Green/Yellow Discharge Around Incision Site) 
 
 Call MD for:  Difficulty Breathing, Headache or Visual Disturbances 
 
 Call MD for:  Hives 
 
 Call MD for:  Persistant Dizziness or Light-Headedness 
 
 Call MD for: Extreme Fatigue 
 
 Follow up:
 [facility]
 [address] 
 [facility] [address]
 [phone]
 
 Schedule an appointment as soon as possible for a visit in 1 week
 
  
 Medication List 
  
 START taking these medications 
    
  cloNIDine 0.2 MG tablet 
 QTY:  60 tablet 
 Refills:  0 
 Doctor's comments:  
 - Hold for SBP less than 100
 
 - Hold for HR less than 60 
 Commonly known as:  CATAPRES 
 Take 0.5 tablets by mouth 2 (two) times daily. 
  
  LORazepam 1 MG tablet 
 QTY:  30 tablet 
 Refills:  0 
 Commonly known as:  ATIVAN 
 Take 1 tablet by mouth every 8 (eight) hours as needed for Anxiety (DO NOT COMBINE WITH ALC
OHOL OR NARCOTICS). 
  
  
 CONTINUE taking these medications 
    
  UNKNOWN TO PATIENT 
 Refills:  0 
  
  UNKNOWN TO PATIENT 
 Refills:  0 
  
  
 STOP taking these medications 
    
  UNKNOWN TO PATIENT 
  
   
 
 Where to Get Your Medications  
  These are the prescriptions that you need to . 
    
 You may get the following medications from any pharmacy 
 -  cloNIDine 0.2 MG tablet 
 -  LORazepam 1 MG tablet 
  
  
  
  
   
 
 Discharge took over 30 minutes, to include final examination, discussion of admission, and 
preparation of prescriptions, instructions for on-going care, follow-up and documentation of
 discharge summary.
 
 Efrain Montoya MD
 @td
  
  Electronically signed by Efrain Montoya MD at 2015  5:34 PM PDTdocumented in this encou
nter
 
 Progress Notes
 Conversion Transaction, Provider Unknown - 2015  3:02 PM PDTFormatting of this note m
ight be different from the original.
 Nurse Progress Note by Batsheva Ball RN at 07/06/15 1502  
  Author:  Batsheva Ball RN Service:  (none) Author Type:  Registered Nurse 
  Filed:  07/06/15 1503 Date of Service:  07/06/15 1502 Status:  Signed 
  :  Batsheva Ball RN (Registered Nurse)   
   
 Patient discharged to private residence per taxi/bus to [facility] [place]. Discharge instructio
ns, prescriptions, and follow up appointments discussed. All questions answered. 
  
  Electronically signed by Conversion Transaction, Provider at 2019  8:37 PM PDTConver
jacob Transaction, Provider Unknown - 2015  9:43 AM PDTFormatting of this note might be
 different from the original.
 Case Management by Tiffany No RN at 07/06/15 0943  
  Author:  Tiffany No RN Service:  (none) Author Type:  Registered Nurse 
  Filed:  07/06/15 1020 Date of Service:  07/06/15 0943 Status:  Addendum 
  :  Tiffany No RN (Registered Nurse)   
  Related Notes:  Original Note by Tiffany No RN (Registered Nurse) filed at 07/06/15 09
48   
   
 Patient discharging home today,  He is agreeable with transportation via [employer] t
o [place], next available time is this afternoon at 16:24.  
 
 [employer] to transport patient to [address] [place] [address] at 15:45, [employer] dispa
[facility] states they will notify [employer]  regarding patient and to be aware of his need for
 transportation to [place] today.
  
  Electronically signed by Conversion Transaction, Provider at 2019  8:40 PM PDTConver
jacob Transaction, Provider Unknown - 2015  5:12 AM PDTFormatting of this note might be
 different from the original.
 Nurse Progress Note by Cindy Evans RN at 07/06/15 0512  
  Author:  Cindy Evans RN Service:  (none) Author Type:  Registered Nurse 
  Filed:  07/06/15 0515 Date of Service:  07/06/15 0512 Status:  Signed 
  :  Cindy Evans RN (Registered Nurse)   
   
 Patient resting in bed.  Vital signs have been stable.  He has been afebrile.  No complaint
s of nausea, vomiting, or pain.  CIWA scores <3 for NOC.  Patient up to walk halls at beginn
 
ing of shift stating "I walked 22 laps".  No acute changes from previous assessment. Patient
 visualized hourly and needs addressed.
 Cindy Evans RN 2015 5:15 AM 
  
  Electronically signed by Conversion Transaction, Provider at 2019  8:37 PM Efrain Pantoja MD - 2015  9:51 PM PDTFormatting of this note might be different from the origi
nal.
 Progress Notes by Efrain Montoya MD at 07/05/15 2151  
  Author:  Efrain Montoya MD Service:  Hospitalist Author Type:  Physician 
  Filed:  07/05/15 2157 Date of Service:  07/05/15 2151 Status:  Signed 
  :  Efrain Montoya MD (Physician)   
   
 [facility]
 Service:  Hospitalist
 Progress Note
 
 Hospital Day:   LOS: 1 day 
 
 SUBJECTIVE
 
      Patient Summary:   refer to H&P for details
 
      Events Overnight:  Pt seen and examined,denies CP or SOB or abdominal pain,he has mild
 alcohol withdrawals,elevated BP, good appetite,he again reports he wants to be sober.
 
 Scheduled Medications 
   chlordiazePOXIDE  10 mg Oral BID 
   cloNIDine  0.2 mg Oral BID 
   enoxaparin  40 mg Subcutaneous Q24H 
   folic acid (FOLVITE) IVPB  1 mg Intravenous Q24H 
  Or    
   prenatal multivitamin & minerals w iron/FA  1 tablet Oral Q24H 
   insulin aspart  0-3 Units Subcutaneous Nightly 
   insulin aspart  0-6 Units Subcutaneous TID AC 
   [START ON 2015] lisinopril  20 mg Oral Daily 
   nicotine  1 patch Transdermal Daily 
   thiamine (VITAMIN B1) IVPB  100 mg Intravenous Q24H 
  Or    
   thiamine  100 mg Oral Q24H 
 
 Continuous Infusions 
   dextrose   
 
 PRN Medications
 aluminum-magnesium hydroxide-simethicone, dextrose, dextrose, dextrose, diazepam, diazepam 
**OR** diazepam, diphenhydrAMINE, glucagon, glucagon, labetalol, LORazepam **OR** LORazepam,
 ondansetron **OR** ondansetron, polyethylene glycol, promethazine
 
 OBJECTIVE
 Vital Signs:
 /99 | Pulse 91 | Temp(Src) 98.1 F (36.7 C) (Oral) | Resp 18 | Ht 1.727 m (5' 8") 
| Wt 81.3 kg (179 lb 3.7 oz) | BMI 27.26 kg/m2 | SpO2 97%
 
 General appearance: alert, appears stated age, cooperative, fatigued, no distress and shake
y
 Head: Normocephalic, without obvious abnormality, atraumatic
 Neck: no adenopathy, no carotid bruit, no JVD, supple, symmetrical, trachea midline and thy
roid not enlarged, symmetric, no tenderness/mass/nodules
 Lungs: clear to auscultation bilaterally
 Heart: regular rate and rhythm, S1, S2 normal, no murmur, click, rub or gallop
 
 Abdomen: soft, non-tender; bowel sounds normal; no masses,  no organomegaly
 Extremities: extremities normal, atraumatic, no cyanosis or edema
 Pulses: 2+ and symmetric
 Neurologic: Grossly normal,tremors,gait is deferred 
 
 DATA
 
 CBC:  
 Lab Results    
 Component  Value Date 
  WBC 6.37 2015 
  RBC 4.66 2015 
  HGB 13.9 2015 
  HCT 42.8 2015 
  MCV 91.8 2015 
  MCH 29.9 2015 
  MCHC 32.6 2015 
  RDW 40.3 2015 
   2015 
  MPV 9.3 2015 
  DIFFTYPE AUTOMATED 2015 
 
 CMP:  
 Lab Results    
 Component  Value Date 
  * 2015 
  K 3.7 2015 
  CL 99 2015 
  CO2 28 2015 
  ANIONGAP 11 2015 
  GLUF 103* 2015 
  BUN 5* 2015 
  CREATININE 0.59* 2015 
  BCR 8 2015 
  CA 9.3 2015 
  PROT 7.9 2015 
  ALB 4.1 2015 
  GLOB 3.8 2015 
  BILITOT 0.9 2015 
  ALP 73 2015 
  AST 30 2015 
  ALT 33 2015 
  EGFR >60 2015 
 
 Recent Labs
 Lab 07/05/15
 0541 
 MG 1.9 
 
 PROBLEM LIST
 
 Principal Problem:
   Alcohol withdrawal (HCC)
 Active Problems:
   Elevated blood pressure
   H/O diabetes mellitus
   Nausea and vomiting in adult
   Smoker
 
 ASSESSMENT & PLAN
 
 1. Acute alcohol withdrawal, slowly improving,continue librium,CIWa,clonidine,thiamine and 
folic acid, telemetry,SLIVF  Patient counseled about alcohol abstinence and also patient cou
nseled for alcohol rehabilitation on discharge will consult [facility] for recourses
 
 2. Nausea and vomiting likely from alcohol withdrawal,improving continue Phenergan and Zofr
an IV as needed.
 
 3. Diabetes type 2 continue low ISS and monitor BG adjust as needed
 
 4. Hypertension continue clonidine and increase lisinopril V as needed BP meds 
 
 5. Active smoking. Counseled about smoking cessation. Offered nicotine patch.
 
 6.DVT px Lovenox subQ and SCDs if tolerated
 
 Disposition:  Admitted 
 Code Status:  Full Code
 
 Efrain Montoya MD
 2015
  
  Electronically signed by Efrain Montoya MD at 2015  9:57 PM PDTConversion Transaction, 
Provider Unknown - 2015  6:39 AM PDTFormatting of this note might be different from th
e original.
 Progress Notes by Arleen Gipson RPH at 07/05/15 0639  
  Author:  Arleen Gipson RPH Service:  (none) Author Type:  Pharmacist 
  Filed:  07/05/15 0639 Date of Service:  07/05/15 0639 Status:  Signed 
  :  Arleen Gipson RPH (Pharmacist)   
   
 Estimated crcl > 100 ml/min based on scr of 0.6
 No renal dosage adjustments needed.
 
  
  Electronically signed by Conversion Transaction, Provider at 2019  8:43 PM PDTConver
jacob Transaction, Provider Unknown - 2015  5:54 AM PDTFormatting of this note might be
 different from the original.
 Nurse Progress Note by Viri Apodaca RN at 07/05/15 0554  
  Author:  Viri Apodaca RN Service:  (none) Author Type:  Registered Nurse 
  Filed:  07/05/15 0557 Date of Service:  07/05/15 0554 Status:  Signed 
  :  Viri Apodaca RN (Registered Nurse)   
   
 Pt slept comfortably throughout the shift with no complaints of nausea, headache, or tremor
s. He states that he is interested in an outpatient program for his alcoholism but that it i
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 His blood pressure was still elevated 150's even with
 the newly added clonidine, otherwise vital's are stable. Will continue to monitor. Viri trinh RN
 
  
  Electronically signed by Conversion Transaction, Provider at 2019  8:42 PM PDTConver
jacob Transaction, Provider Unknown - 2015  6:12 PM PDTFormatting of this note might be
 different from the original.
 Nurse Progress Note by Cici Cummings RN at 07/04/15 1812  
  Author:  Cici Cummings RN Service:  (none) Author Type:  Registered Nurse 
  Filed:  07/04/15 1824 Date of Service:  07/04/15 1812 Status:  Signed 
  :  Cici Cummings RN (Registered Nurse)   
   
 The patient's CIWA scores have ranged from 2-6 throughout the shift (the score of 2 was obt
ained towards the end of the shift).  He has endured occasional nausea, mild itching, sweats
, and anxiety along with a persistent tremor, which has varied in intensity throughout the d
ay.  CIWA scores have consistently remained negative for auditory or visual disturbances, he
 
adaches, agitation, or disorientation.  The patient has received four 5 mg doses of diazepam
, the last of which was oral, as the patient had some difficulty with tolerating the IV form
.  Seizure precautions are in place and there is suction at the bedside.   
 
 The patient's BP has remained in the 140's-150's systolic.  The patient was given lisinopri
l this morning, but reports that his BP tends to run within these parameters as he "forgets 
to take his medications" at home.  VS otherwise stable.  The patient did, however, require 1
 unit of coverage for his blood sugars this afternoon.  
 
 Cici Cummings RN
 2015
 6:22 PM
   
   
  
  Electronically signed by Conversion Transaction, Provider at 2019  8:57 PM PDTConver
jacob Transaction, Provider Unknown - 2015  3:07 PM PDTFormatting of this note might be
 different from the original.
 Case Management by Fidelina Benavides RN at 07/04/15 3093  
  Author:  Fidelina Benavides RN Service:  (none) Author Type:  Registered Nurse 
  Filed:  07/04/15 4122 Date of Service:  07/04/15 1507 Status:  Signed 
  :  Fidelina Benavides RN (Registered Nurse)   
   
 
  07/04/15 7240 
 Discharge Planning Evaluation  
 Admitting Diagnosis EtoH withdrawal 
 Readmission No 
 Living Arrangements Alone 
 Support Systems Friends/neighbors 
 Type of Residence Private residence 
 Mental Status Oriented 
 Anticipated Discharge Plan  
 Post Acute Care Needs Other (comment)
 (AA meetings and [facility] location for insurance through the state of [place] for counseling service
s ([facility] its drug and Etoh counseling)) 
 Plan communicated to patient/family Yes 
 Resources  
 Financial concerns Yes
 (concerned he will lose his job if he doesn't receive a release from work for being hospita
lized) 
 Transportation issues No
 (friend will provide transportation) 
 Patient/Family concerns No 
 Prescription Plan No 
 Previous home health equipment No 
 Vascular access device No 
 Ostomy/Drains/Appliances No 
 Anticipated Disposition  
 Facility Type Home 
 Medicare Important Message (SONJA) Not applicable 
 Met with: Pt and discussed discharge planning, Pt is a 39 y.o., male who has had an intermi
ttent issue with EtoH and understands he needs help.  Pt desires an outpatient setting due t
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  P
t states he will start on a daily AA meeting that occurs at noon close to where he works (wa
lking distance).  Pt also admitted that counseling may be a good choice to get beyond why he
 drinks but states the [facility] drug and alcohol center was closed.  CM suggested g
oing to [facility] to determine what state insurance he may qualify for that has counseling with t
he coverage.  Provided the address and phone number and gave a copy to the patient and liste
d on AVS.
 
 
 Patient's PCP is: PER PT NONE, [facility]
 
 Patient's insurance: [employer] ([facility])
 Coverage concerns: no
 Medication coverage/concerns: no
 [facility] Bedside Delivery: no
 
 Community resources utilized / needed: EtoH tx/support in [place], [place]
 
 Assistance in transportation: no
 
 Identification of any specific education / training: no
 
 Barriers to Discharge / Alternative housing needed: no
 
 Anticipated DCP: home to [place], friend to drive
 
 Fidelina Benavides
 
  
  Electronically signed by Conversion Transaction, Provider at 2019  9:01 PM PDTConver
jacob Transaction, Provider Unknown - 2015  6:31 AM PDTFormatting of this note might be
 different from the original.
 Nurse Progress Note by Masha Bustos RN at 07/04/15 0631  
  Author:  Masha Bustos RN Service:  (none) Author Type:  Registered Nurse 
  Filed:  07/04/15 0633 Date of Service:  07/04/15 0631 Status:  Signed 
  :  Masha Bustos RN (Registered Nurse)   
   
 Patient resting comfortably in bed. Patient has no complaints of nausea, alert and oriented
, stated that he feels tired and would like to rest. VSS, will continue to monitor. Masha harley
 
  
  Electronically signed by Conversion Transaction, Provider at 2019  8:58 PM PDTConver
jacob Transaction, Provider Unknown - 2015  5:37 AM PDTFormatting of this note might be
 different from the original.
 Progress Notes by Mesfin Pennington RPH at 07/04/15 0537  
  Author:  Mesfin Pennington RPH Service:  (none) Author Type:  Pharmacist 
  Filed:  07/04/15 0537 Date of Service:  07/04/15 0537 Status:  Signed 
  :  Mesfin Pennington RPH (Pharmacist)   
   
 Pharmacy will renal dose as needed once labs are available.
  
  Electronically signed by Conversion Transaction, Provider at 2019  8:44 PM PDTdocume
nted in this encounter
 
 H&P Notes
 Roxana Garner MD - 2015  7:36 AM PDTFormatting of this note might be dif
ferent from the original.
 H&P by Roxana Garner MD at 07/04/15 0736  
  Author:  Roxana Garner MD Service:  Hospitalist Author Type:  Physician 
  Filed:  07/04/15 1024 Date of Service:  07/04/15 0736 Status:  Signed 
  :  Roxana Garner MD (Physician)   
  Related Notes:  Original Note by Roxana Garner MD (Physician) filed at 07/04/15 0740   
   
 [facility]
 Service:  Hospitalist
 Admission History & Physical
 
 
 Date of Admission:  2015
 Requesting Physician: [facility]
 Reason for Admission:  ETOH withdrawal.
 History Obtained From:  patient
 CHIEF COMPLAINT:  Nausea and vomiting.
 HISTORY OF PRESENT ILLNESS
 The patient is a 39 y.o. male with significant past medical history.
 39 year old male transferred to this facility from [facility] for admission
 here 
 (lack of bed availability at their facility). The patient presented to their ED earlier tod
ay with nausea and vomiting. 
 He has a history of alcohol abuse with his last drink about 24+ hours ago. Medical history 
also includes diabetes. 
 On arrival there, he was tachycardic and diaphoretic with nausea and vomiting. Labs reveale
d an elevated lactic 
 acid at 2.8. He was given fluids and zofran as well as valium for treatment of alcohol with
drawals. 
 On arrival here, he is alert and oriented. Denies any pain or nausea and he has normal gilmar
ls.
 Patient take HTN and DM, 3 medications, but can't name them.
 
 REVIEW OF SYSTEMS
 Negative except for pertinent items noted in HPI.
 
 Past Medical History   
 Diagnosis  Date 
   Diabetes mellitus, type 2 (HCC)  
   Hypertension  
   Alcoholism (HCC)  
 
 Past Surgical History     
 Procedure   Laterality Date 
   Back surgery    
   low back surgery as infant    
   Spine surgery    
 
 Immunizations:  Influenza:  Not indicated 
             Pneumoccocal:  Not indicated 
 
 Allergies   
 Allergen  Reactions 
   Acetaminophen Anaphylaxis 
   Penicillins Anaphylaxis 
 
 Prescriptions prior to admission     
 Medication  Sig Dispense Refill 
   UNKNOWN TO PATIENT 2 (two) times daily. Indications: Antihypertensive   
   UNKNOWN TO PATIENT Indications: Insulin injection subQ   
   UNKNOWN TO PATIENT Take  by mouth. Indications: Diabetes, diabetic medication   
 
 History reviewed. No pertinent family history.
 
 History 
 
 Social History    
   Marital Status:  Single 
   Spouse Name: N/A 
   Number of Children: N/A 
   Years of Education:  N/A 
 
 
 Occupational History  
   Not on file. 
 
 Social History Main Topics    
   Smoking status:  Current Some Day Smoker 
   Smokeless tobacco:  Never Used 
   Alcohol Use:  42.0 oz/week 
   70 Cans of beer per week  
   Drug Use:  No 
   Sexual Activity:  Not on file 
 
 Other Topics  Concern 
   Not on file  
 
 Social History Narrative  
   No narrative on file 
 
 PHYSICAL EXAM
 Vital Signs:
 /101 | Pulse 87 | Temp(Src) 97.7 F (36.5 C) (Oral) | Resp 16 | Ht 1.727 m (5' 8")
 | Wt 81.5 kg (179 lb 10.8 oz) | BMI 27.33 kg/m2 | SpO2 96%
 
 General Appearance:    Alert, cooperative, no distress, appears stated age 
 Head:    Normocephalic, without obvious abnormality, atraumatic 
 Eyes:    PERRL, conjunctiva/corneas clear, EOM's intact, fundi 
   benign, both eyes      
   
 Nose:   Nares normal,  no drainage    or sinus tenderness 
 Throat:   Dry mucous membranes, Lips, mucosa, and tongue normal; teeth and gums normal 
 Neck:   Supple, symmetrical, trachea midline, no adenopathy;    
   thyroid:  No enlargement/tenderness/nodules; no carotid
   bruit or JVD 
 Back:     Symmetric, no curvature, ROM normal, no CVA tenderness 
 Lungs:     Clear to auscultation bilaterally, respirations unlabored 
 Chest wall:    No tenderness or deformity 
 Heart:    Regular rate and rhythm, S1 and S2 normal, no murmur, rub   or gallop 
 Abdomen:     Soft, non-tender, bowel sounds active all four quadrants, 
   no masses, no organomegaly 
 Extremities:   Extremities normal, atraumatic, no cyanosis or edema 
 Pulses:   2+ and symmetric all extremities 
 Skin:   Skin color, texture, turgor normal, no rashes or lesions 
 Lymph nodes:   Cervical, supraclavicular, and axillary nodes normal 
 Neurologic:   CNII-XII intact. Normal strength, sensation and reflexes   
   Throughout  +mild hand tremors. 
 
 DATA
 CBC:  No results found for: WBC, RBC, HGB, HCT, MCV, MCH, MCHC, RDW, PLT, MPV, DIFFTYPE
 CMP:  No results found for: NA, K, CL, CO2, ANIONGAP, GLUF, BUN, CREATININE, BCR, CA, PROT,
 ALB, GLOB, AGRATIO, BILITOT, ALP, AST, ALT, EGFR
 PT/INR:  No results found for: PROTIME, INR
 
 PROBLEM LIST
 
 Principal Problem:
   Alcohol withdrawal (HCC)
 Active Problems:
   Elevated blood pressure
   H/O diabetes mellitus
   Nausea and vomiting in adult
   Smoker
 
 
 ASSESSMENT & PLAN
 
 1. Acute alcohol withdrawal, slowly improving after IV hydration, Valium, and Ativan. Also 
patient will be started on alcohol withdrawal protocol and telemetry monitor his heart rate 
had been normalized. Continue IV hydration. Patient counseled about alcohol abstinence and a
lso patient counseled for alcohol rehabilitation on discharge.
 2. Nausea and vomiting likely from alcohol withdrawal, currently improving. Will continue P
henergan and Zofran IV as needed.
 3. Diabetes type 2. Patient is unable to give medication name or list. Will start him on No
voLog sliding scale, check hemoglobin .
 4. Hypertension. Will start him on lisinopril 10 mg and Vasotec IV as needed.
 5. Active smoking. Counseled about smoking cessation. Offered nicotine patch.
 6. FULL CODE.
 7. Time for admission 60 minutes.
 8. Estimated hospital stay 2 nights.
  
 
 Disposition: Admit to medical floor.
 
 Code Status:  Full Code.
 
 Primary Care Physician:  PER PT NONE
 
 Roxana Garner MD
 2015
 
  
  Electronically signed by Jamir, Transcription Conversion at 2019  8:59 PM PDTdocumente
d in this encounter
 
 ED Notes
 Conversion Transaction, Provider Unknown - 2015  5:04 AM PDTFormatting of this note m
ight be different from the original.
 ED Notes by Berna Mckenzie RN at 07/04/15 0504  
  Author:  Berna Mckenzie RN Service:  Emergency Department Author Type:  Registered Nurse 
  Filed:  07/04/15 0504 Date of Service:  07/04/15 0504 Status:  Signed 
  :  Berna Mckenzie RN (Registered Nurse)   
   
 Dr. Garner has not put in order for lisinopril to be given now, pt was transferred to floor 
with KENDELL Flanagan. Masha was informed of this. Bedside report given to Masha GIFFORD RN. 
 
 Berna Mckenzie RN
 07/04/15 5017
  
  Electronically signed by Conversion Transaction, Provider at 2019  8:47 PM PDTConver
jacob Transaction, Provider Unknown - 2015  4:50 AM PDTFormatting of this note might be
 different from the original.
 ED Notes by Berna Mckenzie RN at 07/04/15 0450  
  Author:  Berna Mckenzie RN Service:  Emergency Department Author Type:  Registered Nurse 
  Filed:  07/04/15 0451 Date of Service:  07/04/15 0450 Status:  Signed 
  :  Berna Mckenzie RN (Registered Nurse)   
   
 Dr. Garner paged, I cannot pull the lisinopril from the pyxis as the order is for 0900 
 
 Berna Mckenzie RN
 07/04/15 125
  
  Electronically signed by Conversion Transaction, Provider at 2019  8:46 PM PDTConver
jacob Transaction, Provider Unknown - 2015  4:46 AM PDTFormatting of this note might be
 
 different from the original.
 ED Notes by Berna Mckenzie RN at 07/04/15 0446  
  Author:  Berna Mckenzie RN Service:  Emergency Department Author Type:  Registered Nurse 
  Filed:  07/04/15 0446 Date of Service:  07/04/15 0446 Status:  Signed 
  :  Berna Mckenzie RN (Registered Nurse)   
   
 Dr. Garner requests lisinopril be given at this time. 
 
 Berna Mckenzie RN
 07/04/15 0446
  
  Electronically signed by Conversion Transaction, Provider at 2019  8:49 PM PDTConver
jacob Transaction, Provider Unknown - 2015  4:40 AM PDTFormatting of this note might be
 different from the original.
 ED Notes by Berna Mckenzie RN at 07/04/15 0440  
  Author:  Berna Mckenzie RN Service:  Emergency Department Author Type:  Registered Nurse 
  Filed:  07/04/15 0441 Date of Service:  07/04/15 0440 Status:  Signed 
  :  Berna Mckenzie RN (Registered Nurse)   
   
 3OP lead called and informed pt is ready for admit 
 
 Berna Mckenzie RN
 07/04/15 0441
  
  Electronically signed by Conversion Transaction, Provider at 2019  8:49 PM PDTConver
jacob Transaction, Provider Unknown - 2015  4:33 AM PDTFormatting of this note might be
 different from the original.
 ED Notes by Berna Mckenzie RN at 07/04/15 0433  
  Author:  Berna Mckenzie RN Service:  Emergency Department Author Type:  Registered Nurse 
  Filed:  07/04/15 0433 Date of Service:  07/04/15 0433 Status:  Signed 
  :  Berna Mckenzie RN (Registered Nurse)   
   
 IV Benadryl given per Dr. Garner's orders. 
 
 Berna Mckenzie RN
 07/04/15 0433
  
  Electronically signed by Conversion Transaction, Provider at 2019  8:48 PM PDTConver
jacob Transaction, Provider Unknown - 2015  4:12 AM PDTFormatting of this note might be
 different from the original.
 ED Notes by Berna Mckenzie RN at 07/04/15 0412  
  Author:  Berna Mckenzie RN Service:  Emergency Department Author Type:  Registered Nurse 
  Filed:  07/04/15 0413 Date of Service:  07/04/15 0412 Status:  Signed 
  :  Berna Mckenzie RN (Registered Nurse)   
   
 hospitalist at bedside 
 
 Berna Mckenzie RN
 07/04/15 0413
  
  Electronically signed by Conversion Transaction, Provider at 2019  8:52 PM PDTConver
jacob Transaction, Provider Unknown - 2015  3:12 AM PDTFormatting of this note might be
 different from the original.
 ED Notes by Berna Mckenzie RN at 07/04/15 0312  
  Author:  Berna Mckenzie RN Service:  Emergency Department Author Type:  Registered Nurse 
  Filed:  07/04/15 0312 Date of Service:  07/04/15 0312 Status:  Signed 
  :  Berna Mckenzie RN (Registered Nurse)   
   
 Kimberly informed of pt's blood sugar, crackers and juice given 
 
 
 Berna Mckenzie RN
 07/04/15 0312
  
  Electronically signed by Conversion Transaction, Provider at 2019  8:51 PM PDTConver
jacob Transaction, Provider Unknown - 2015  2:58 AM PDTFormatting of this note might be
 different from the original.
 ED Notes by Berna Mckenzie RN at 07/04/15 0258  
  Author:  Berna Mckenzie RN Service:  Emergency Department Author Type:  Registered Nurse 
  Filed:  07/04/15 0258 Date of Service:  07/04/15 0258 Status:  Signed 
  :  Berna Mckenzie RN (Registered Nurse)   
   
 Pt states he hasnt eaten in 1.5 days, and his sugar was 108 when he was in [place]. 
 
 Berna Mckenzie RN
 07/04/15 0258
  
  Electronically signed by Conversion Transaction, Provider at 2019  8:50 PM PDTHarrin
gtonOdalys - 2015  1:30 AM PDTFormatting of this note might be different from the o
riginal.
 ED Provider Notes by Odalys Huertas PA-C at 07/04/15 0130  
  Author:  Odalys Huertas PA-C Service:  Emergency Department Author Type:  Physician A
ssistant - Certified 
  Filed:  07/04/15 0243 Date of Service:  07/04/15 0130 Status:  Attested 
  :  Odalys Huertas PA-C (Physician Assistant - Certified)  Cosigner:  Kayleigh alford DO at 07/04/15 0429 
  Attestation signed by Rachel M Cookson, DO at 07/04/15 0429  
 I have discussed this patient's case with the Advanced Practice Clinician, and provided yonatan
ropriate supervision.  We have discussed all likely DDX and have addressed these appropriate
ly.  I agree with his assessment and plan considering the HPI and physical exam.    
 
  
 
  
  
   
 Procedures
 [facility]
 Department of Emergency Medicine
 
 Pre-arrival Provider: Another ED
 Provider Name: Dr. Abbasi ([facility])
 Pertinent History and Concerns: Alcohol withdrawal, last drink 20 hours ago, no bed availab
le, persistent N/V, tachy 115, diaphoretic.  hx DM
 Relevant Labs or Studies: Lactic acid 2.8, down to 2.2 after 2 L NS
 Relevant Medications: Valium with some improvement
 (07/03/15 1939 : Bryan German DO)
 HPI 
 History of Present Illness 
 
 Patient Identification
 Pramod Arnold is a 39 y.o. male.
 
 Patient information was obtained from patient.
 History/Exam limitations: none.
 Patient presented to the Emergency Department by: [facility]
 
 Chief Complaint 
 Chief Complaint  
 Patient presents with  
   Emesis 
 
   Nausea 
 
 39 year old male transferred to this facility from [facility] in [place] for admission
 here (lack of bed availability at their facility).  The patient presented to their ED earli
er today with nausea and vomiting.  He has a history of alcohol abuse with his last drink ab
out 24+ hours ago.  Medical history also includes diabetes.  On arrival there, he was tachyc
ardic and diaphoretic with nausea and vomiting.  Labs revealed an elevated lactic acid at 2.
8.  He was given fluids and zofran as well as valium for treatment of alcohol withdrawals.  
On arrival here, he is alert and oriented.  Denies any pain or nausea and he has normal gilmar
ls.
 
 Past Medical History   
 Diagnosis  Date 
   Diabetes mellitus, type 2 (HCC)  
   Hypertension  
   Alcoholism (HCC)  
 
 Past Surgical History     
 Procedure   Laterality Date 
   Back surgery    
   low back surgery as infant    
 
 Prior to Admission medications  
 Not on File 
 
 No Known Allergies
 History 
 
 Social History    
   Marital Status:  Single 
   Spouse Name: N/A 
   Number of Children: N/A 
   Years of Education:  N/A 
 
 Occupational History  
   Not on file. 
 
 Social History Main Topics    
   Smoking status:  Current Some Day Smoker 
   Smokeless tobacco:  Never Used 
   Alcohol Use:  6.0 oz/week 
   10 Cans of beer per week  
   Drug Use:  No 
   Sexual Activity:  Not on file 
 
 Other Topics  Concern 
   Not on file  
 
 Social History Narrative  
   No narrative on file 
 
 History reviewed. No pertinent family history.
  Review of Systems 
 Constitutional: Negative for fever and chills. 
 Respiratory: Negative for shortness of breath.  
 Cardiovascular: Negative for chest pain and palpitations. 
 Gastrointestinal: Positive for nausea, vomiting and abdominal pain. Negative for diarrhea a
nd constipation. 
 Genitourinary: Negative for dysuria. 
 Neurological: Negative for headaches. 
 
 Psychiatric/Behavioral: Positive for substance abuse. 
 All other systems reviewed and are negative.
 
 Physical Exam 
 Physical Exam 
 
 /104 | Pulse 91 | Temp(Src) 98.1 F (36.7 C) (Oral) | Resp 18 | Wt 79.379 kg (175 
lb) | SpO2 95%
 
 Pulse Oximetry interpretation: Normal
 
 General: Alert, in no apparent distress with elevated blood pressure but o/w normal vitals 
as above.
 Eyes:  Normal inspection, pupils equal and round, non-icteric
 ENT:  Moist mucous membranes.
 Neck:  Normal inspection
   Supple
 Cardiovascular: Rate and rhythm normal
   No murmurs
 Respiratory: Breath sounds normal bilaterally.  Respirations are non-labored
 Abdomen: Soft, non-tender, non-distended
   No guarding or rebound
 Genitourinary: Deferred
 Rectal exam: Deferred
 Skin:  Color normal
   Warm and dry
   No rash
 Neuro:  No motor deficit
   No sensory deficit
   Alert and oriented.  CN grossly intact.  Moves all extremities purposefully.
 
 ED Course 
 Medical Decision Making and Emergency Department Course 
 Patient transferred here for admission for treatment of nausea and vomiting and alcohol wit
hdrawals.  He is pain free and hemodynamically stable at this time.  I will continue IV flui
ds of NS and admit to the hospitalist.  I have reviewed labs performed at [facility].
 
 ED Department Course
 Spoke with Dr. Garner who agrees to admit the patient to the hospitalist service.
 
 Records Reviewed
 Records sent with patient from [facility]
 
 Labs & Radiology Results 
 Laboratory Evaluation
 Results  
  None 
   
 
 Radiology and EKG Evaluation
 Imaging Results  
  None 
   
 
 Diagnosis & Disposition 
 ED Diagnoses   
  
  Final diagnoses  
  Nausea and vomiting in adult  
 
  Alcohol withdrawal, uncomplicated (HCC)  
  Elevated LDH  
  H/O diabetes mellitus  
  Elevated blood pressure  
    
 
 Disposition:
 ED Disposition 
  Admit/Observation Bed request special needs: None
 Diagnosis?: Nausea and vomiting, alcohol withdrawal, elevated LDH, H/O DM
  
  
 
 Odalys Huertas PA-C
 07/04/15 0243
 
 Rachel M Cookson, 
 07/04/15 0429
  
  Electronically signed by Odalys Huertas PA-C at 2019  9:03 PM PDTConversion T
ransaction, Provider Unknown - 2015  1:05 AM PDTFormatting of this note might be diffe
rent from the original.
 ED Notes by Berna Mckenzie RN at 07/04/15 0105  
  Author:  Berna Mckenzie RN Service:  Emergency Department Author Type:  Registered Nurse 
  Filed:  07/04/15 0106 Date of Service:  07/04/15 0105 Status:  Signed 
  :  Berna Mckenzie RN (Registered Nurse)   
   
 Registration called, informed of wrong . 
 
 Berna Mckenzie RN
 07/04/15 0106
  
  Electronically signed by Conversion Transaction, Provider at 2019  8:55 PM PDTConver
jacob Transaction, Provider Unknown - 2015 12:56 AM PDTFormatting of this note might be
 different from the original.
 ED Notes by Berna Mckenzie RN at 07/04/15 0056  
  Author:  Berna Mckenzie RN Service:  Emergency Department Author Type:  Registered Nurse 
  Filed:  07/04/15 0057 Date of Service:  07/04/15 0056 Status:  Signed 
  :  Berna Mckenzie RN (Registered Nurse)   
   
 Pt last drink was at 1100 yesterday morning. He has diffuse abd pain that began a few days.
 He also has nausea, no vomiting. No fevers. He drinks 10 etoh drinks/day
 
 Berna Mckenzie RN
 07/04/15 0057
  
  Electronically signed by Conversion Transaction, Provider at 2019  8:56 PM PDTConver
jacob Transaction, Provider Unknown - 2015 12:49 AM PDTFormatting of this note might be
 different from the original.
 ED Notes by Berna Mckenzie RN at 07/04/15 0049  
  Author:  Berna Mckenzie RN Service:  Emergency Department Author Type:  Registered Nurse 
  Filed:  07/04/15 0049 Date of Service:  07/04/15 0049 Status:  Signed 
  :  Berna Mckenzie RN (Registered Nurse)   
   
 Pt pt given 5mg of valium by EMS prior to transport. 
 
 Berna Mckenzie RN
 07/04/15 0049
  
  Electronically signed by Conversion Transaction, Provider at 2019  8:54 PM PDTConver
 
jacob Transaction, Provider Unknown - 2015 11:35 PM PDTFormatting of this note might be
 different from the original.
 ED Notes by Evelyn Jara RN at 07/03/15 2335  
  Author:  Evelyn Jara RN Service:  (none) Author Type:  Registered Nurse 
  Filed:  07/03/15 2339 Date of Service:  07/03/15 2335 Status:  Signed 
  :  Evelyn Jara RN (Registered Nurse)   
   
 Report from: KENDELL Gutierrez at [facility] 
 Came in with nausea. Admits to alcohol intoxication. Being transferred for admit for DT's, 
their hospital is full. 
 3L NS
 5mg x3 valium
 HR: 104, BP: 158/99 BS: 148
 Lactic: 2.8
 Allergic to PCN and tylenol. 
 
 Evelyn Jara RN
 07/03/15 2339
  
  Electronically signed by Conversion Transaction, Provider at 2019  9:04 PM PDTdocume
nted in this encounter
 
 Miscellaneous Notes
 Plan of Care - Conversion Transaction, Provider Unknown - 2015 10:56 PM PDTFormatting
 of this note might be different from the original.
 Plan of Care by Cindy Evans RN at 07/05/15 2256  
  Author:  Cindy Evans RN Service:  (none) Author Type:  Registered Nurse 
  Filed:  07/05/15 2256 Date of Service:  07/05/15 2256 Status:  Signed 
  :  Cindy Evans RN (Registered Nurse)   
   
 Problem: Safety
 Goal: Patient will be injury free during hospitalization
 Assess and monitor vitals signs, neurological status including level of consciousness and o
rientation. Assess patient  s risk for falls and implement fall prevention plan of care and
 interventions per hospital policy. 
 
 Ensure arm band on, uncluttered walking paths in room, adequate room lighting, call light a
nd overbed table within reach, bed in low position, wheels locked, side rails up per policy,
 and non-skid footwear provided. 
 Outcome: Progressing
 Patient educated on call light use.  Call light within reach.  Bed alarm on for safety.  No
n skid foot wear use encouraged.  Room cleared of extra items.
 
   
  
  Electronically signed by Conversion Transaction, Provider at 2019  8:41 PM PDTPlan o
f Care - Efrain Montoya MD - 2015  8:00 PM PDTFormatting of this note might be different
 from the original.
 Plan of Care by Efrain Montoya MD at 07/04/15 2000  
  Author:  Efrain Montoya MD Service:  Hospitalist Author Type:  Physician 
  Filed:  07/04/15 2119 Date of Service:  07/04/15 2000 Status:  Signed 
  :  Efrain Montoya MD (Physician)   
   
 Patient seen and examined,AXOX3 for now reports he would like to be detox ed,he has shaky h
ands and sweaty,elevated BP,continue CIWA,IVF ,add librium BID,monitor on tele,electrolytes.
 
 
 Wt Readings from Last 3 Encounters:  
 07/04/15 81.5 kg (179 lb 10.8 oz) 
 
 
 Temp Readings from Last 3 Encounters:  
 07/04/15 98.5 F (36.9 C) Oral 
 
 BP Readings from Last 3 Encounters:  
 07/04/15 155/108 
 
 Pulse Readings from Last 3 Encounters:  
 07/04/15 86 
 
 Lab Results    
 Component  Value Date 
  WBC 5.90 2015 
  HGB 13.9 2015 
  HCT 42.1 2015 
  MCV 91.4 2015 
   2015 
 
 GLUCOSE      
 Date   Value Ref Range Status 
 2015   102* 65 - 99 mg/dL Final 
   Comment:    
   Testing performed at [facility], [address] [place] [address]  [id number]    
 
 BUN      
 Date   Value Ref Range Status 
 2015   6* 8 - 25 mg/dL Final 
   Comment:    
   Testing performed at [facility], [address] [place] [address]  [address]    
 
 CREATININE      
 Date   Value Ref Range Status 
 2015   0.60* 0.70 - 1.30 mg/dL Final 
   Comment:    
   Testing performed at [facility], [address] [place] [address]  [address]    
 
 BUN/CREAT      
 Date   Value Ref Range Status 
 2015   10  Final 
   Comment:    
   Testing performed at [facility], [address] [place] [address]  [address]    
 
 TOTAL PROTEIN      
 Date   Value Ref Range Status 
 2015   7.7 6.3 - 8.2 g/dL Final 
   Comment:    
   Testing performed at [facility], [address] [place] [address]  [address]    
 
 GLOBULIN      
 Date   Value Ref Range Status 
 2015   3.6 1.3 - 4.9 g/dL Final 
   Comment:    
   Testing performed at [facility], [address] [place] [address]  [address]    
 
 TBIL      
 Date   Value Ref Range Status 
 2015   0.8 0.1 - 1.5 mg/dL Final 
   Comment:    
   Testing performed at [facility], [address] [place] [address]  [address]    
 
 ALT      
 
 Date   Value Ref Range Status 
 2015   31 10 - 65 U/L Final 
   Comment:    
   Testing performed at [facility], [address] [place] [address]  [address]    
 
 AST      
 Date   Value Ref Range Status 
 2015   29 10 - 45 U/L Final 
   Comment:    
   Testing performed at [facility], [address] [place] [address]  [address]    
 
 SODIUM      
 Date   Value Ref Range Status 
 2015   134* 135 - 143 mmol/L Final 
   Comment:    
   Testing performed at [facility], [address] [place] [address]  [address]    
 
 POTASSIUM      
 Date   Value Ref Range Status 
 2015   3.5 3.5 - 4.9 mmol/L Final 
   Comment:    
   Testing performed at [facility], [address] [place] [address]  [address]    
 
 CHLORIDE      
 Date   Value Ref Range Status 
 2015   100 99 - 109 mmol/L Final 
   Comment:    
   Testing performed at [address]  [address]    
 
 CO2      
 Date   Value Ref Range Status 
 2015   28 23 - 32 mmol/L Final 
   Comment:    
   Testing performed at [facility], [address] [place] [address]  [address]    
 
 ANION GAP AGAP      
 Date   Value Ref Range Status 
 2015   10 5 - 20 mmol/L Final 
   Comment:    
   Testing performed at [facility], [address] [place]  [address]    
 
  
  Electronically signed by Efrain Montoya MD at 2015  9:19 PM PDTdocumented in this encou
nter
 
 Plan of Treatment
 Not on filedocumented as of this encounter
 
 Procedures
 
 
+-------------------+--------+-------------+----------------------+----------------------+
| Procedure Name    | Priori | Date/Time   | Associated Diagnosis | Comments             |
|                   | ty     |             |                      |                      |
+-------------------+--------+-------------+----------------------+----------------------+
| POC GLUCOSE       | Routin | 2015  |                      |   Results for this   |
|                   | e      | 11:16 AM    |                      | procedure are in the |
|                   |        | PDT         |                      |  results section.    |
+-------------------+--------+-------------+----------------------+----------------------+
| POC GLUCOSE       | Routin | 2015  |                      |   Results for this   |
 
|                   | e      |  4:58 AM    |                      | procedure are in the |
|                   |        | PDT         |                      |  results section.    |
+-------------------+--------+-------------+----------------------+----------------------+
| POC GLUCOSE       | Routin | 2015  |                      |   Results for this   |
|                   | e      |  9:27 PM    |                      | procedure are in the |
|                   |        | PDT         |                      |  results section.    |
+-------------------+--------+-------------+----------------------+----------------------+
| POC GLUCOSE       | Routin | 2015  |                      |   Results for this   |
|                   | e      |  4:03 PM    |                      | procedure are in the |
|                   |        | PDT         |                      |  results section.    |
+-------------------+--------+-------------+----------------------+----------------------+
| POC GLUCOSE       | Routin | 2015  |                      |   Results for this   |
|                   | e      | 11:36 AM    |                      | procedure are in the |
|                   |        | PDT         |                      |  results section.    |
+-------------------+--------+-------------+----------------------+----------------------+
| EXTERNAL LAB: CBC | Routin | 2015  |                      |   Results for this   |
|                   | e      |  5:41 AM    |                      | procedure are in the |
|                   |        | PDT         |                      |  results section.    |
+-------------------+--------+-------------+----------------------+----------------------+
| PHOSPHORUS        | Routin | 2015  |                      |   Results for this   |
|                   | e      |  5:41 AM    |                      | procedure are in the |
|                   |        | PDT         |                      |  results section.    |
+-------------------+--------+-------------+----------------------+----------------------+
| MAGNESIUM         | Routin | 2015  |                      |   Results for this   |
|                   | e      |  5:41 AM    |                      | procedure are in the |
|                   |        | PDT         |                      |  results section.    |
+-------------------+--------+-------------+----------------------+----------------------+
| COMPREHENSIVE     | Routin | 2015  |                      |   Results for this   |
| METABOLIC PANEL   | e      |  5:41 AM    |                      | procedure are in the |
|                   |        | PDT         |                      |  results section.    |
+-------------------+--------+-------------+----------------------+----------------------+
| POC GLUCOSE       | Routin | 2015  |                      |   Results for this   |
|                   | e      |  5:12 AM    |                      | procedure are in the |
|                   |        | PDT         |                      |  results section.    |
+-------------------+--------+-------------+----------------------+----------------------+
| POC GLUCOSE       | Routin | 2015  |                      |   Results for this   |
|                   | e      |  9:14 PM    |                      | procedure are in the |
|                   |        | PDT         |                      |  results section.    |
+-------------------+--------+-------------+----------------------+----------------------+
| POC GLUCOSE       | Routin | 2015  |                      |   Results for this   |
|                   | e      |  4:07 PM    |                      | procedure are in the |
|                   |        | PDT         |                      |  results section.    |
+-------------------+--------+-------------+----------------------+----------------------+
| EXTERNAL LAB: CBC | Routin | 2015  |                      |   Results for this   |
|                   | e      | 11:20 AM    |                      | procedure are in the |
|                   |        | PDT         |                      |  results section.    |
+-------------------+--------+-------------+----------------------+----------------------+
| POC GLUCOSE       | Routin | 2015  |                      |   Results for this   |
|                   | e      | 11:20 AM    |                      | procedure are in the |
|                   |        | PDT         |                      |  results section.    |
+-------------------+--------+-------------+----------------------+----------------------+
| PHOSPHORUS        | Routin | 2015  |                      |   Results for this   |
|                   | e      | 11:20 AM    |                      | procedure are in the |
|                   |        | PDT         |                      |  results section.    |
+-------------------+--------+-------------+----------------------+----------------------+
| MAGNESIUM         | Routin | 2015  |                      |   Results for this   |
|                   | e      | 11:20 AM    |                      | procedure are in the |
|                   |        | PDT         |                      |  results section.    |
+-------------------+--------+-------------+----------------------+----------------------+
| COMPREHENSIVE     | Routin | 2015  |                      |   Results for this   |
 
| METABOLIC PANEL   | e      | 11:20 AM    |                      | procedure are in the |
|                   |        | PDT         |                      |  results section.    |
+-------------------+--------+-------------+----------------------+----------------------+
| LIPID PANEL       | Routin | 2015  |                      |   Results for this   |
|                   | e      |  5:38 AM    |                      | procedure are in the |
|                   |        | PDT         |                      |  results section.    |
+-------------------+--------+-------------+----------------------+----------------------+
| TSH               | Routin | 2015  |                      |   Results for this   |
|                   | e      |  5:38 AM    |                      | procedure are in the |
|                   |        | PDT         |                      |  results section.    |
+-------------------+--------+-------------+----------------------+----------------------+
| POC GLUCOSE       | Routin | 2015  |                      |   Results for this   |
|                   | e      |  5:13 AM    |                      | procedure are in the |
|                   |        | PDT         |                      |  results section.    |
+-------------------+--------+-------------+----------------------+----------------------+
| POC GLUCOSE       | Routin | 2015  |                      |   Results for this   |
|                   | e      |  4:33 AM    |                      | procedure are in the |
|                   |        | PDT         |                      |  results section.    |
+-------------------+--------+-------------+----------------------+----------------------+
| ALCOHOL           | Routin | 2015  |                      |   Results for this   |
|                   | e      |  3:06 AM    |                      | procedure are in the |
|                   |        | PDT         |                      |  results section.    |
+-------------------+--------+-------------+----------------------+----------------------+
| POC GLUCOSE       | Routin | 2015  |                      |   Results for this   |
|                   | e      |  3:01 AM    |                      | procedure are in the |
|                   |        | PDT         |                      |  results section.    |
+-------------------+--------+-------------+----------------------+----------------------+
 documented in this encounter
 
 Results
 POC Glucose (2015 11:16 AM PDT)
 
+-------------+--------------------------+---------------+------------+--------------+
| Component   | Value                    | Ref Range     | Performed  | Pathologist  |
|             |                          |               | At         | Signature    |
+-------------+--------------------------+---------------+------------+--------------+
| Glucose,    | 102 (H)Comment: Testing  | 65 - 99 mg/dL | EXTERNAL   |              |
| Fingerstick | performed at [facility];[address]     |               | LAB        |              |
|             | [address];[place], [place]   |               |            |              |
|             | [id number]                    |               |            |              |
+-------------+--------------------------+---------------+------------+--------------+
 
 
 
+----------+
| Specimen |
+----------+
|          |
+----------+
 
 
 
+----------------+---------+--------------------+--------------+
| Performing     | Address | City/State/Zipcode | Phone Number |
| Organization   |         |                    |              |
+----------------+---------+--------------------+--------------+
|   EXTERNAL LAB |         |                    |              |
+----------------+---------+--------------------+--------------+
 POC Glucose (2015  4:58 AM PDT)
 
 
+-------------+-------------------------+---------------+------------+--------------+
| Component   | Value                   | Ref Range     | Performed  | Pathologist  |
|             |                         |               | At         | Signature    |
+-------------+-------------------------+---------------+------------+--------------+
| Glucose,    | 94Comment: Testing      | 65 - 99 mg/dL | EXTERNAL   |              |
| Fingerstick | performed at [facility];[address]    |               | LAB        |              |
|             | [address];[place]  |               |            |              |
|             | [id number]                   |               |            |              |
+-------------+-------------------------+---------------+------------+--------------+
 
 
 
+----------+
| Specimen |
+----------+
|          |
+----------+
 
 
 
+----------------+---------+--------------------+--------------+
| Performing     | Address | City/State/Zipcode | Phone Number |
| Organization   |         |                    |              |
+----------------+---------+--------------------+--------------+
|   EXTERNAL LAB |         |                    |              |
+----------------+---------+--------------------+--------------+
 POC Glucose (2015  9:27 PM PDT)
 
+-------------+--------------------------+---------------+------------+--------------+
| Component   | Value                    | Ref Range     | Performed  | Pathologist  |
|             |                          |               | At         | Signature    |
+-------------+--------------------------+---------------+------------+--------------+
| Glucose,    | 132 (H)Comment: Testing  | 65 - 99 mg/dL | EXTERNAL   |              |
| Fingerstick | performed at [facility];[address]     |               | LAB        |              |
|             | [address];[place], [place]   |               |            |              |
|             | [id number]                    |               |            |              |
+-------------+--------------------------+---------------+------------+--------------+
 
 
 
+----------+
| Specimen |
+----------+
|          |
+----------+
 
 
 
+----------------+---------+--------------------+--------------+
| Performing     | Address | City/State/Zipcode | Phone Number |
| Organization   |         |                    |              |
+----------------+---------+--------------------+--------------+
|   EXTERNAL LAB |         |                    |              |
+----------------+---------+--------------------+--------------+
 POC Glucose (2015  4:03 PM PDT)
 
+-------------+-------------------------+---------------+------------+--------------+
| Component   | Value                   | Ref Range     | Performed  | Pathologist  |
|             |                         |               | At         | Signature    |
+-------------+-------------------------+---------------+------------+--------------+
 
| Glucose,    | 95Comment: Testing      | 65 - 99 mg/dL | EXTERNAL   |              |
| Fingerstick | performed at [facility];[address]    |               | LAB        |              |
|             | [address];[place]  |               |            |              |
|             | [id number]                   |               |            |              |
+-------------+-------------------------+---------------+------------+--------------+
 
 
 
+----------+
| Specimen |
+----------+
|          |
+----------+
 
 
 
+----------------+---------+--------------------+--------------+
| Performing     | Address | City/State/Zipcode | Phone Number |
| Organization   |         |                    |              |
+----------------+---------+--------------------+--------------+
|   EXTERNAL LAB |         |                    |              |
+----------------+---------+--------------------+--------------+
 POC Glucose (2015 11:36 AM PDT)
 
+-------------+-------------------------+---------------+------------+--------------+
| Component   | Value                   | Ref Range     | Performed  | Pathologist  |
|             |                         |               | At         | Signature    |
+-------------+-------------------------+---------------+------------+--------------+
| Glucose,    | 98Comment: Testing      | 65 - 99 mg/dL | EXTERNAL   |              |
| Fingerstick | performed at [facility];[address]    |               | LAB        |              |
|             | [address];[place], [place]  |               |            |              |
|             | [id number]                   |               |            |              |
+-------------+-------------------------+---------------+------------+--------------+
 
 
 
+----------+
| Specimen |
+----------+
|          |
+----------+
 
 
 
+----------------+---------+--------------------+--------------+
| Performing     | Address | City/State/Zipcode | Phone Number |
| Organization   |         |                    |              |
+----------------+---------+--------------------+--------------+
|   EXTERNAL LAB |         |                    |              |
+----------------+---------+--------------------+--------------+
 External Lab: CBC (2015  5:41 AM PDT)
 
+-------------+--------------------------+-----------------+------------+--------------+
| Component   | Value                    | Ref Range       | Performed  | Pathologist  |
|             |                          |                 | At         | Signature    |
+-------------+--------------------------+-----------------+------------+--------------+
| WBC         | 6.37Comment: Testing     | 3.80 - 11.00    | EXTERNAL   |              |
|             | performed at [facility], [address] | K/uL            | LAB        |              |
|             |  [address],        |                 |            |              |
|             | [place]  [id number]     |                 |            |              |
 
+-------------+--------------------------+-----------------+------------+--------------+
| Non-Fetal   | 4.66Comment: Testing     | 4.20 - 5.70     | EXTERNAL   |              |
| Red Blood   | performed at [facility], [address] | M/uL            | LAB        |              |
| Cells       |  [address],        |                 |            |              |
| Counted     | [place]  [id number]     |                 |            |              |
+-------------+--------------------------+-----------------+------------+--------------+
| Hemoglobin  | 13.9Comment: Testing     | 13.2 - 17.0     | EXTERNAL   |              |
|             | performed at [facility], [address] | g/dL            | LAB        |              |
|             |  [address],        |                 |            |              |
|             | [place]  [id number]     |                 |            |              |
+-------------+--------------------------+-----------------+------------+--------------+
| Hematocrit, | 42.8Comment: Testing     | 39.0 - 50.0 %   | EXTERNAL   |              |
|  POC        | performed at [facility], [address] |                 | LAB        |              |
|             |  [address],        |                 |            |              |
|             | [place]  [address]     |                 |            |              |
+-------------+--------------------------+-----------------+------------+--------------+
| MCV         | 91.8Comment: Testing     | 80.0 - 100.0 fl | EXTERNAL   |              |
|             | performed at [facility], [address] |                 | LAB        |              |
|             |  [address],        |                 |            |              |
|             | [place]  [id number]     |                 |            |              |
+-------------+--------------------------+-----------------+------------+--------------+
| MCH         | 29.9Comment: Testing     | 27.0 - 34.0 pg  | EXTERNAL   |              |
|             | performed at [facility], [address] |                 | LAB        |              |
|             |  [address],        |                 |            |              |
|             | [place]  [id number]     |                 |            |              |
+-------------+--------------------------+-----------------+------------+--------------+
| MCHC        | 32.6Comment: Testing     | 32.0 - 35.5     | EXTERNAL   |              |
|             | performed at [facility], [address] | g/dL            | LAB        |              |
|             |  [address],        |                 |            |              |
|             | [place]  [address]     |                 |            |              |
+-------------+--------------------------+-----------------+------------+--------------+
| RDW-CV      | 40.3Comment: Testing     | 37 - 53 fl      | EXTERNAL   |              |
|             | performed at [facility], [address] |                 | LAB        |              |
|             |  [address],        |                 |            |              |
|             | [place]  [address]     |                 |            |              |
+-------------+--------------------------+-----------------+------------+--------------+
| Platelet    | 185Comment: Testing      | 150 - 400 K/uL  | EXTERNAL   |              |
| Count       | performed at [facility], [address] |                 | LAB        |              |
| Plasma      |  [address],        |                 |            |              |
|             | [place]  [address]     |                 |            |              |
+-------------+--------------------------+-----------------+------------+--------------+
| MPV         | 9.3Comment: Testing      | fl              | EXTERNAL   |              |
|             | performed at [facility], [address] |                 | LAB        |              |
|             |  [address],        |                 |            |              |
|             | [place]  [id number]     |                 |            |              |
+-------------+--------------------------+-----------------+------------+--------------+
| Differentia | AUTOMATEDComment:        |                 | EXTERNAL   |              |
| l Type      | Testing performed at     |                 | LAB        |              |
|             | [facility], [address]   |                 |            |              |
|             | [place]      |                 |            |              |
|             | [id number]                    |                 |            |              |
+-------------+--------------------------+-----------------+------------+--------------+
| % Segmented | 64.16Comment: Testing    | %               | EXTERNAL   |              |
|             | performed at [facility], [address] |                 | LAB        |              |
| Neutrophils |  [address],        |                 |            |              |
|             | [place]  [address]     |                 |            |              |
+-------------+--------------------------+-----------------+------------+--------------+
| %           | 16.53Comment: Testing    | %               | EXTERNAL   |              |
| Lymphocytes | performed at [facility], [address] |                 | LAB        |              |
|             |  [address],        |                 |            |              |
 
|             | [place]  [id number]     |                 |            |              |
+-------------+--------------------------+-----------------+------------+--------------+
| % Monocytes | 14.84Comment: Testing    | %               | EXTERNAL   |              |
|             | performed at [facility], [address] |                 | LAB        |              |
|             |  [address],        |                 |            |              |
|             | [place]  [address]     |                 |            |              |
+-------------+--------------------------+-----------------+------------+--------------+
| %           | 3.88Comment: Testing     | %               | EXTERNAL   |              |
| Eosinophils | performed at [facility], [address] |                 | LAB        |              |
|             |  [address],        |                 |            |              |
|             | [place]  [address]     |                 |            |              |
+-------------+--------------------------+-----------------+------------+--------------+
| % Basophils | 0.59Comment: Testing     | %               | EXTERNAL   |              |
|             | performed at [facility], [address] |                 | LAB        |              |
|             |  [address],        |                 |            |              |
|             | [place]  [address]     |                 |            |              |
+-------------+--------------------------+-----------------+------------+--------------+
| Absolute    | 4.09Comment: Testing     | 1.90 - 7.40     | EXTERNAL   |              |
| Segmented   | performed at [facility], [address] | K/uL            | LAB        |              |
| Neutrophils |  [address],        |                 |            |              |
|             | [place]  [address]     |                 |            |              |
+-------------+--------------------------+-----------------+------------+--------------+
| Absolute    | 1.05Comment: Testing     | 1.00 - 3.90     | EXTERNAL   |              |
| Lymphocytes | performed at [facility], [address] | K/uL            | LAB        |              |
|             |  [address],        |                 |            |              |
|             | [place]  [address]     |                 |            |              |
+-------------+--------------------------+-----------------+------------+--------------+
| Absolute    | 0.95 (H)Comment: Testing | 0.00 - 0.80     | EXTERNAL   |              |
| Monocytes   |  performed at [facility], [address]  | K/uL            | LAB        |              |
|             | [address],       |                 |            |              |
|             | [place]  [id number]     |                 |            |              |
+-------------+--------------------------+-----------------+------------+--------------+
| Absolute    | 0.25Comment: Testing     | 0.00 - 0.50     | EXTERNAL   |              |
| Eosinophils | performed at [facility], [address] | K/uL            | LAB        |              |
|             |  [address],        |                 |            |              |
|             | [place]  [address]     |                 |            |              |
+-------------+--------------------------+-----------------+------------+--------------+
| Absolute    | 0.04Comment: Testing     | 0.00 - 0.10     | EXTERNAL   |              |
| Basophils   | performed at [facility], [address] | K/uL            | LAB        |              |
|             |  [address],        |                 |            |              |
|             | [place]  [address]     |                 |            |              |
+-------------+--------------------------+-----------------+------------+--------------+
 
 
 
+-----------------+
| Specimen        |
+-----------------+
| Blood specimen  |
| (specimen)      |
+-----------------+
 
 
 
+----------------+---------+--------------------+--------------+
| Performing     | Address | City/State/Zipcode | Phone Number |
| Organization   |         |                    |              |
+----------------+---------+--------------------+--------------+
|   EXTERNAL LAB |         |                    |              |
+----------------+---------+--------------------+--------------+
 
 Phosphorus (2015  5:41 AM PDT)
 
+------------+--------------------------+-----------------+------------+--------------+
| Component  | Value                    | Ref Range       | Performed  | Pathologist  |
|            |                          |                 | At         | Signature    |
+------------+--------------------------+-----------------+------------+--------------+
| PHOSPHORUS | 4.1Comment: Testing      | 2.3 - 4.8 mg/dL | EXTERNAL   |              |
|            | performed at [facility], [address] |                 | LAB        |              |
|            |  [address],        |                 |            |              |
|            | [place]  [id number]     |                 |            |              |
+------------+--------------------------+-----------------+------------+--------------+
 
 
 
+-----------------+
| Specimen        |
+-----------------+
| Blood specimen  |
| (specimen)      |
+-----------------+
 
 
 
+----------------+---------+--------------------+--------------+
| Performing     | Address | City/State/Zipcode | Phone Number |
| Organization   |         |                    |              |
+----------------+---------+--------------------+--------------+
|   EXTERNAL LAB |         |                    |              |
+----------------+---------+--------------------+--------------+
 Magnesium (2015  5:41 AM PDT)
 
+-----------+--------------------------+-----------------+------------+--------------+
| Component | Value                    | Ref Range       | Performed  | Pathologist  |
|           |                          |                 | At         | Signature    |
+-----------+--------------------------+-----------------+------------+--------------+
| Magnesium | 1.9Comment: Testing      | 1.7 - 2.4 mg/dL | EXTERNAL   |              |
|           | performed at [facility], [address] |                 | LAB        |              |
|           |  [address],        |                 |            |              |
|           | [place]  [id number]     |                 |            |              |
+-----------+--------------------------+-----------------+------------+--------------+
 
 
 
+-----------------+
| Specimen        |
+-----------------+
| Blood specimen  |
| (specimen)      |
+-----------------+
 
 
 
+----------------+---------+--------------------+--------------+
| Performing     | Address | City/State/Zipcode | Phone Number |
| Organization   |         |                    |              |
+----------------+---------+--------------------+--------------+
|   EXTERNAL LAB |         |                    |              |
+----------------+---------+--------------------+--------------+
 Comprehensive Metabolic Panel (2015  5:41 AM PDT)
 
 
+-------------+--------------------------+-----------------+------------+--------------+
| Component   | Value                    | Ref Range       | Performed  | Pathologist  |
|             |                          |                 | At         | Signature    |
+-------------+--------------------------+-----------------+------------+--------------+
| Na          | 134 (L)Comment: Testing  | 135 - 143       | EXTERNAL   |              |
|             | performed at [facility], [address] | mmol/L          | LAB        |              |
|             |  [address],        |                 |            |              |
|             | [place]  [id number]     |                 |            |              |
+-------------+--------------------------+-----------------+------------+--------------+
| K           | 3.7Comment: Testing      | 3.5 - 4.9       | EXTERNAL   |              |
|             | performed at [facility], [address] | mmol/L          | LAB        |              |
|             |  [address],        |                 |            |              |
|             | [place]  [id number]     |                 |            |              |
+-------------+--------------------------+-----------------+------------+--------------+
| Cl          | 99Comment: Testing       | 99 - 109 mmol/L | EXTERNAL   |              |
|             | performed at [facility], [address] |                 | LAB        |              |
|             |  [address],        |                 |            |              |
|             | [place]  [id number]     |                 |            |              |
+-------------+--------------------------+-----------------+------------+--------------+
| CO2         | 28Comment: Testing       | 23 - 32 mmol/L  | EXTERNAL   |              |
|             | performed at [facility], [address] |                 | LAB        |              |
|             |  [address],        |                 |            |              |
|             | [place]  [id number]     |                 |            |              |
+-------------+--------------------------+-----------------+------------+--------------+
| Anion Gap   | 11Comment: Testing       | 5 - 20 mmol/L   | EXTERNAL   |              |
|             | performed at [facility], [address] |                 | LAB        |              |
|             |  [address],        |                 |            |              |
|             | [place]  [id number]     |                 |            |              |
+-------------+--------------------------+-----------------+------------+--------------+
| Glucose,    | 103 (H)Comment: Testing  | 65 - 99 mg/dL   | EXTERNAL   |              |
| Fasting     | performed at [facility], [address] |                 | LAB        |              |
|             |  [address],        |                 |            |              |
|             | [place]  [address]     |                 |            |              |
+-------------+--------------------------+-----------------+------------+--------------+
| BUN         | 5 (L)Comment: Testing    | 8 - 25 mg/dL    | EXTERNAL   |              |
|             | performed at [facility], [address] |                 | LAB        |              |
|             |  [address],        |                 |            |              |
|             | [place]  [address]     |                 |            |              |
+-------------+--------------------------+-----------------+------------+--------------+
| Creatinine  | 0.59 (L)Comment: Testing | 0.70 - 1.30     | EXTERNAL   |              |
|             |  performed at [facility], [address]  | mg/dL           | LAB        |              |
|             | [address],       |                 |            |              |
|             | [place]  [address]     |                 |            |              |
+-------------+--------------------------+-----------------+------------+--------------+
| BUN/Creatin | 8Comment: Testing        |                 | EXTERNAL   |              |
| ine Ratio   | performed at [facility], [address] |                 | LAB        |              |
|             |  [address],        |                 |            |              |
|             | [place]  [address]     |                 |            |              |
+-------------+--------------------------+-----------------+------------+--------------+
| Calcium     | 9.3Comment: Testing      | 8.5 - 10.5      | EXTERNAL   |              |
|             | performed at [facility], [address] | mg/dL           | LAB        |              |
|             |  [address],        |                 |            |              |
|             | [place]  [address]     |                 |            |              |
+-------------+--------------------------+-----------------+------------+--------------+
| Protein,    | 7.9Comment: Testing      | 6.3 - 8.2 g/dL  | EXTERNAL   |              |
| Total       | performed at [facility], [address] |                 | LAB        |              |
|             |  [address],        |                 |            |              |
|             | [place]  [address]     |                 |            |              |
+-------------+--------------------------+-----------------+------------+--------------+
| Albumin     | 4.1Comment: Testing      | 3.6 - 5.0 g/dL  | EXTERNAL   |              |
 
|             | performed at [facility], [address] |                 | LAB        |              |
|             |  [address],        |                 |            |              |
|             | [place]  [address]     |                 |            |              |
+-------------+--------------------------+-----------------+------------+--------------+
| Globulin    | 3.8Comment: Testing      | 1.3 - 4.9 g/dL  | EXTERNAL   |              |
|             | performed at [facility], [address] |                 | LAB        |              |
|             |  [address],        |                 |            |              |
|             | [place]  [address]     |                 |            |              |
+-------------+--------------------------+-----------------+------------+--------------+
| A/G Ratio   | 1.1Comment: Testing      | 1.0 - 2.4       | EXTERNAL   |              |
|             | performed at [facility], [address] |                 | LAB        |              |
|             |  [address],        |                 |            |              |
|             | [place]  [address]     |                 |            |              |
+-------------+--------------------------+-----------------+------------+--------------+
| Bilirubin   | 0.9Comment: Testing      | 0.1 - 1.5 mg/dL | EXTERNAL   |              |
| Total       | performed at [facility], [address] |                 | LAB        |              |
|             |  [address],        |                 |            |              |
|             | [place]  [address]     |                 |            |              |
+-------------+--------------------------+-----------------+------------+--------------+
| ALP,        | 73Comment: Testing       | 35 - 115 U/L    | EXTERNAL   |              |
| External    | performed at [facility], [address] |                 | LAB        |              |
|             |  [address],        |                 |            |              |
|             | [place]  [address]     |                 |            |              |
+-------------+--------------------------+-----------------+------------+--------------+
| AST         | 30Comment: Testing       | 10 - 45 U/L     | EXTERNAL   |              |
|             | performed at [facility], [address] |                 | LAB        |              |
|             |  [address],        |                 |            |              |
|             | [place]  [address]     |                 |            |              |
+-------------+--------------------------+-----------------+------------+--------------+
| ALT         | 33Comment: Testing       | 10 - 65 U/L     | EXTERNAL   |              |
|             | performed at [facility], [address] |                 | LAB        |              |
|             |  [address],        |                 |            |              |
|             | [place]  [address]     |                 |            |              |
+-------------+--------------------------+-----------------+------------+--------------+
| Estimated   | >60Comment: GFR <60:     | mL/min/1.73m2   | EXTERNAL   |              |
| GFR         | CHRONIC KIDNEY DISEASE,  |                 | LAB        |              |
|             | IF FOUND OVER A 3 MONTH  |                 |            |              |
|             | PERIOD.GFR <15: KIDNEY   |                 |            |              |
|             | FAILURE.FOR       |                 |            |              |
|             | [employer], MULTIPLY THE  |                 |            |              |
|             | CALCULATED GFR BY        |                 |            |              |
|             | 1.210.Testing performed  |                 |            |              |
|             | at [facility], [address]           |                 |            |              |
|             | [address],         |                 |            |              |
|             | [place]   [address]    |                 |            |              |
+-------------+--------------------------+-----------------+------------+--------------+
 
 
 
+-----------------+
| Specimen        |
+-----------------+
| Blood specimen  |
| (specimen)      |
+-----------------+
 
 
 
+----------------+---------+--------------------+--------------+
| Performing     | Address | City/State/Zipcode | Phone Number |
 
| Organization   |         |                    |              |
+----------------+---------+--------------------+--------------+
|   EXTERNAL LAB |         |                    |              |
+----------------+---------+--------------------+--------------+
 POC Glucose (2015  5:12 AM PDT)
 
+-------------+--------------------------+---------------+------------+--------------+
| Component   | Value                    | Ref Range     | Performed  | Pathologist  |
|             |                          |               | At         | Signature    |
+-------------+--------------------------+---------------+------------+--------------+
| Glucose,    | 101 (H)Comment: Testing  | 65 - 99 mg/dL | EXTERNAL   |              |
| Fingerstick | performed at [facility];[address]     |               | LAB        |              |
|             | [address];[place], [place]   |               |            |              |
|             | [id number]                    |               |            |              |
+-------------+--------------------------+---------------+------------+--------------+
 
 
 
+----------+
| Specimen |
+----------+
|          |
+----------+
 
 
 
+----------------+---------+--------------------+--------------+
| Performing     | Address | City/State/Zipcode | Phone Number |
| Organization   |         |                    |              |
+----------------+---------+--------------------+--------------+
|   EXTERNAL LAB |         |                    |              |
+----------------+---------+--------------------+--------------+
 POC Glucose (2015  9:14 PM PDT)
 
+-------------+--------------------------+---------------+------------+--------------+
| Component   | Value                    | Ref Range     | Performed  | Pathologist  |
|             |                          |               | At         | Signature    |
+-------------+--------------------------+---------------+------------+--------------+
| Glucose,    | 125 (H)Comment: Testing  | 65 - 99 mg/dL | EXTERNAL   |              |
| Fingerstick | performed at [facility];[address]     |               | LAB        |              |
|             | [address];[place]   |               |            |              |
|             | [id number]                    |               |            |              |
+-------------+--------------------------+---------------+------------+--------------+
 
 
 
+----------+
| Specimen |
+----------+
|          |
+----------+
 
 
 
+----------------+---------+--------------------+--------------+
| Performing     | Address | City/State/Zipcode | Phone Number |
| Organization   |         |                    |              |
+----------------+---------+--------------------+--------------+
|   EXTERNAL LAB |         |                    |              |
+----------------+---------+--------------------+--------------+
 
 POC Glucose (2015  4:07 PM PDT)
 
+-------------+--------------------------+---------------+------------+--------------+
| Component   | Value                    | Ref Range     | Performed  | Pathologist  |
|             |                          |               | At         | Signature    |
+-------------+--------------------------+---------------+------------+--------------+
| Glucose,    | 168 (H)Comment: Testing  | 65 - 99 mg/dL | EXTERNAL   |              |
| Fingerstick | performed at [facility];[address]     |               | LAB        |              |
|             | [address];[place]   |               |            |              |
|             | [id number]                    |               |            |              |
+-------------+--------------------------+---------------+------------+--------------+
 
 
 
+----------+
| Specimen |
+----------+
|          |
+----------+
 
 
 
+----------------+---------+--------------------+--------------+
| Performing     | Address | City/State/Zipcode | Phone Number |
| Organization   |         |                    |              |
+----------------+---------+--------------------+--------------+
|   EXTERNAL LAB |         |                    |              |
+----------------+---------+--------------------+--------------+
 External Lab: CBC (2015 11:20 AM PDT)
 
+-------------+--------------------------+-----------------+------------+--------------+
| Component   | Value                    | Ref Range       | Performed  | Pathologist  |
|             |                          |                 | At         | Signature    |
+-------------+--------------------------+-----------------+------------+--------------+
| WBC         | 5.90Comment: Testing     | 3.80 - 11.00    | EXTERNAL   |              |
|             | performed at [facility], [address] | K/uL            | LAB        |              |
|             |  [address],        |                 |            |              |
|             | [place]  [address]     |                 |            |              |
+-------------+--------------------------+-----------------+------------+--------------+
| Non-Fetal   | 4.61Comment: Testing     | 4.20 - 5.70     | EXTERNAL   |              |
| Red Blood   | performed at [facility], [address] | M/uL            | LAB        |              |
| Cells       |  [address],        |                 |            |              |
| Counted     | [place]  [id number]     |                 |            |              |
+-------------+--------------------------+-----------------+------------+--------------+
| Hemoglobin  | 13.9Comment: Testing     | 13.2 - 17.0     | EXTERNAL   |              |
|             | performed at [facility], [address] | g/dL            | LAB        |              |
|             |  [address],        |                 |            |              |
|             | [place]  [address]     |                 |            |              |
+-------------+--------------------------+-----------------+------------+--------------+
| Hematocrit, | 42.1Comment: Testing     | 39.0 - 50.0 %   | EXTERNAL   |              |
|  POC        | performed at [facility], [address] |                 | LAB        |              |
|             |  [address],        |                 |            |              |
|             | [place]  [address]     |                 |            |              |
+-------------+--------------------------+-----------------+------------+--------------+
| MCV         | 91.4Comment: Testing     | 80.0 - 100.0 fl | EXTERNAL   |              |
|             | performed at [facility], [address] |                 | LAB        |              |
|             |  [address],        |                 |            |              |
|             | [place]  [address]     |                 |            |              |
+-------------+--------------------------+-----------------+------------+--------------+
| MCH         | 30.2Comment: Testing     | 27.0 - 34.0 pg  | EXTERNAL   |              |
 
|             | performed at [facility], [address] |                 | LAB        |              |
|             |  [address],        |                 |            |              |
|             | [place]  [address]     |                 |            |              |
+-------------+--------------------------+-----------------+------------+--------------+
| MCHC        | 33.0Comment: Testing     | 32.0 - 35.5     | EXTERNAL   |              |
|             | performed at [facility], [address] | g/dL            | LAB        |              |
|             |  [address],        |                 |            |              |
|             | [place]  [address]     |                 |            |              |
+-------------+--------------------------+-----------------+------------+--------------+
| RDW-CV      | 40.3Comment: Testing     | 37 - 53 fl      | EXTERNAL   |              |
|             | performed at [facility], [address] |                 | LAB        |              |
|             |  [address],        |                 |            |              |
|             | [place]  [address]     |                 |            |              |
+-------------+--------------------------+-----------------+------------+--------------+
| Platelet    | 179Comment: Testing      | 150 - 400 K/uL  | EXTERNAL   |              |
| Count       | performed at [facility], [address] |                 | LAB        |              |
| Plasma      |  [address],        |                 |            |              |
|             | [place]  [address]     |                 |            |              |
+-------------+--------------------------+-----------------+------------+--------------+
| MPV         | 9.1Comment: Testing      | fl              | EXTERNAL   |              |
|             | performed at [facility], [address] |                 | LAB        |              |
|             |  [address],        |                 |            |              |
|             | [place]  [address]     |                 |            |              |
+-------------+--------------------------+-----------------+------------+--------------+
| Differentia | AUTOMATEDComment:        |                 | EXTERNAL   |              |
| l Type      | Testing performed at     |                 | LAB        |              |
|             | [facility], [address]   |                 |            |              |
|             | [place]      |                 |            |              |
|             | [id number]                    |                 |            |              |
+-------------+--------------------------+-----------------+------------+--------------+
| % Segmented | 66.11Comment: Testing    | %               | EXTERNAL   |              |
|             | performed at [facility], [address] |                 | LAB        |              |
| Neutrophils |  [address],        |                 |            |              |
|             | [place]  [address]     |                 |            |              |
+-------------+--------------------------+-----------------+------------+--------------+
| %           | 10.48Comment: Testing    | %               | EXTERNAL   |              |
| Lymphocytes | performed at [facility], [address] |                 | LAB        |              |
|             |  [address],        |                 |            |              |
|             | [place]  [address]     |                 |            |              |
+-------------+--------------------------+-----------------+------------+--------------+
| % Monocytes | 17.73Comment: Testing    | %               | EXTERNAL   |              |
|             | performed at [facility], [address] |                 | LAB        |              |
|             |  [address],        |                 |            |              |
|             | [place]  [address]     |                 |            |              |
+-------------+--------------------------+-----------------+------------+--------------+
| %           | 4.21Comment: Testing     | %               | EXTERNAL   |              |
| Eosinophils | performed at [facility], [address] |                 | LAB        |              |
|             |  [address],        |                 |            |              |
|             | [place]  [id number]     |                 |            |              |
+-------------+--------------------------+-----------------+------------+--------------+
| % Basophils | 1.47Comment: Testing     | %               | EXTERNAL   |              |
|             | performed at [facility], [address] |                 | LAB        |              |
|             |  [address],        |                 |            |              |
|             | [place]  [address]     |                 |            |              |
+-------------+--------------------------+-----------------+------------+--------------+
| Absolute    | 3.90Comment: Testing     | 1.90 - 7.40     | EXTERNAL   |              |
| Segmented   | performed at [facility], [address] | K/uL            | LAB        |              |
| Neutrophils |  [address],        |                 |            |              |
|             | [place]  [id number]     |                 |            |              |
+-------------+--------------------------+-----------------+------------+--------------+
 
| Absolute    | 0.62 (L)Comment: Testing | 1.00 - 3.90     | EXTERNAL   |              |
| Lymphocytes |  performed at [facility], [address]  | K/uL            | LAB        |              |
|             | [address],       |                 |            |              |
|             | [place]  [address]     |                 |            |              |
+-------------+--------------------------+-----------------+------------+--------------+
| Absolute    | 1.05 (H)Comment: Testing | 0.00 - 0.80     | EXTERNAL   |              |
| Monocytes   |  performed at [facility], [address]  | K/uL            | LAB        |              |
|             | [address],       |                 |            |              |
|             | [place]  [address]     |                 |            |              |
+-------------+--------------------------+-----------------+------------+--------------+
| Absolute    | 0.25Comment: Testing     | 0.00 - 0.50     | EXTERNAL   |              |
| Eosinophils | performed at [facility], [address] | K/uL            | LAB        |              |
|             |  [address],        |                 |            |              |
|             | [place]  [address]     |                 |            |              |
+-------------+--------------------------+-----------------+------------+--------------+
| Absolute    | 0.09Comment: Testing     | 0.00 - 0.10     | EXTERNAL   |              |
| Basophils   | performed at [facility], [address] | K/uL            | LAB        |              |
|             |  [address],        |                 |            |              |
|             | [place]  [address]     |                 |            |              |
+-------------+--------------------------+-----------------+------------+--------------+
 
 
 
+-----------------+
| Specimen        |
+-----------------+
| Blood specimen  |
| (specimen)      |
+-----------------+
 
 
 
+----------------+---------+--------------------+--------------+
| Performing     | Address | City/State/Zipcode | Phone Number |
| Organization   |         |                    |              |
+----------------+---------+--------------------+--------------+
|   EXTERNAL LAB |         |                    |              |
+----------------+---------+--------------------+--------------+
 POC Glucose (2015 11:20 AM PDT)
 
+-------------+-------------------------+---------------+------------+--------------+
| Component   | Value                   | Ref Range     | Performed  | Pathologist  |
|             |                         |               | At         | Signature    |
+-------------+-------------------------+---------------+------------+--------------+
| Glucose,    | 91Comment: Testing      | 65 - 99 mg/dL | EXTERNAL   |              |
| Fingerstick | performed at [facility];[address]    |               | LAB        |              |
|             | [address];[place]  |               |            |              |
|             | [id number]                   |               |            |              |
+-------------+-------------------------+---------------+------------+--------------+
 
 
 
+----------+
| Specimen |
+----------+
|          |
+----------+
 
 
 
 
+----------------+---------+--------------------+--------------+
| Performing     | Address | City/State/Zipcode | Phone Number |
| Organization   |         |                    |              |
+----------------+---------+--------------------+--------------+
|   EXTERNAL LAB |         |                    |              |
+----------------+---------+--------------------+--------------+
 Phosphorus (2015 11:20 AM PDT)
 
+------------+--------------------------+-----------------+------------+--------------+
| Component  | Value                    | Ref Range       | Performed  | Pathologist  |
|            |                          |                 | At         | Signature    |
+------------+--------------------------+-----------------+------------+--------------+
| PHOSPHORUS | 4.2Comment: Testing      | 2.3 - 4.8 mg/dL | EXTERNAL   |              |
|            | performed at [facility], [address] |                 | LAB        |              |
|            |  [address],        |                 |            |              |
|            | [place]  [address]     |                 |            |              |
+------------+--------------------------+-----------------+------------+--------------+
 
 
 
+-----------------+
| Specimen        |
+-----------------+
| Blood specimen  |
| (specimen)      |
+-----------------+
 
 
 
+----------------+---------+--------------------+--------------+
| Performing     | Address | City/State/Zipcode | Phone Number |
| Organization   |         |                    |              |
+----------------+---------+--------------------+--------------+
|   EXTERNAL LAB |         |                    |              |
+----------------+---------+--------------------+--------------+
 Magnesium (2015 11:20 AM PDT)
 
+-----------+--------------------------+-----------------+------------+--------------+
| Component | Value                    | Ref Range       | Performed  | Pathologist  |
|           |                          |                 | At         | Signature    |
+-----------+--------------------------+-----------------+------------+--------------+
| Magnesium | 2.0Comment: Testing      | 1.7 - 2.4 mg/dL | EXTERNAL   |              |
|           | performed at [facility], [address] |                 | LAB        |              |
|           |  [address],        |                 |            |              |
|           | [place]  [address]     |                 |            |              |
+-----------+--------------------------+-----------------+------------+--------------+
 
 
 
+-----------------+
| Specimen        |
+-----------------+
| Blood specimen  |
| (specimen)      |
+-----------------+
 
 
 
+----------------+---------+--------------------+--------------+
| Performing     | Address | City/State/Zipcode | Phone Number |
 
| Organization   |         |                    |              |
+----------------+---------+--------------------+--------------+
|   EXTERNAL LAB |         |                    |              |
+----------------+---------+--------------------+--------------+
 Comprehensive Metabolic Panel (2015 11:20 AM PDT)
 
+-------------+--------------------------+-----------------+------------+--------------+
| Component   | Value                    | Ref Range       | Performed  | Pathologist  |
|             |                          |                 | At         | Signature    |
+-------------+--------------------------+-----------------+------------+--------------+
| Na          | 134 (L)Comment: Testing  | 135 - 143       | EXTERNAL   |              |
|             | performed at [facility], [address] | mmol/L          | LAB        |              |
|             |  [address],        |                 |            |              |
|             | [place]  [id number]     |                 |            |              |
+-------------+--------------------------+-----------------+------------+--------------+
| K           | 3.5Comment: Testing      | 3.5 - 4.9       | EXTERNAL   |              |
|             | performed at [facility], [address] | mmol/L          | LAB        |              |
|             |  [address],        |                 |            |              |
|             | [place]  [address]     |                 |            |              |
+-------------+--------------------------+-----------------+------------+--------------+
| Cl          | 100Comment: Testing      | 99 - 109 mmol/L | EXTERNAL   |              |
|             | performed at [facility], [address] |                 | LAB        |              |
|             |  [address],        |                 |            |              |
|             | [place]  [address]     |                 |            |              |
+-------------+--------------------------+-----------------+------------+--------------+
| CO2         | 28Comment: Testing       | 23 - 32 mmol/L  | EXTERNAL   |              |
|             | performed at [facility], [address] |                 | LAB        |              |
|             |  [address],        |                 |            |              |
|             | [place]  [address]     |                 |            |              |
+-------------+--------------------------+-----------------+------------+--------------+
| Anion Gap   | 10Comment: Testing       | 5 - 20 mmol/L   | EXTERNAL   |              |
|             | performed at [facility], [address] |                 | LAB        |              |
|             |  [address],        |                 |            |              |
|             | [place]  [address]     |                 |            |              |
+-------------+--------------------------+-----------------+------------+--------------+
| Glucose,    | 102 (H)Comment: Testing  | 65 - 99 mg/dL   | EXTERNAL   |              |
| Fasting     | performed at [facility], [address] |                 | LAB        |              |
|             |  [address],        |                 |            |              |
|             | [place]  [address]     |                 |            |              |
+-------------+--------------------------+-----------------+------------+--------------+
| BUN         | 6 (L)Comment: Testing    | 8 - 25 mg/dL    | EXTERNAL   |              |
|             | performed at [facility], [address] |                 | LAB        |              |
|             |  [address],        |                 |            |              |
|             | [place]  [address]     |                 |            |              |
+-------------+--------------------------+-----------------+------------+--------------+
| Creatinine  | 0.60 (L)Comment: Testing | 0.70 - 1.30     | EXTERNAL   |              |
|             |  performed at [facility], [address]  | mg/dL           | LAB        |              |
|             | [address],       |                 |            |              |
|             | [place]  [address]     |                 |            |              |
+-------------+--------------------------+-----------------+------------+--------------+
| BUN/Creatin | 10Comment: Testing       |                 | EXTERNAL   |              |
| ine Ratio   | performed at [facility], [address] |                 | LAB        |              |
|             |  [address],        |                 |            |              |
|             | [place]  [address]     |                 |            |              |
+-------------+--------------------------+-----------------+------------+--------------+
| Calcium     | 9.1Comment: Testing      | 8.5 - 10.5      | EXTERNAL   |              |
|             | performed at [facility], [address] | mg/dL           | LAB        |              |
|             |  [address],        |                 |            |              |
|             | [place]  [address]     |                 |            |              |
+-------------+--------------------------+-----------------+------------+--------------+
 
| Protein,    | 7.7Comment: Testing      | 6.3 - 8.2 g/dL  | EXTERNAL   |              |
| Total       | performed at [facility], [address] |                 | LAB        |              |
|             |  [address],        |                 |            |              |
|             | [place]  [address]     |                 |            |              |
+-------------+--------------------------+-----------------+------------+--------------+
| Albumin     | 4.1Comment: Testing      | 3.6 - 5.0 g/dL  | EXTERNAL   |              |
|             | performed at [facility], [address] |                 | LAB        |              |
|             |  [address],        |                 |            |              |
|             | [place]  [address]     |                 |            |              |
+-------------+--------------------------+-----------------+------------+--------------+
| Globulin    | 3.6Comment: Testing      | 1.3 - 4.9 g/dL  | EXTERNAL   |              |
|             | performed at [facility], [address] |                 | LAB        |              |
|             |  [address],        |                 |            |              |
|             | [place]  [address]     |                 |            |              |
+-------------+--------------------------+-----------------+------------+--------------+
| A/G Ratio   | 1.1Comment: Testing      | 1.0 - 2.4       | EXTERNAL   |              |
|             | performed at [facility], [address] |                 | LAB        |              |
|             |  [address],        |                 |            |              |
|             | [place]  [address]     |                 |            |              |
+-------------+--------------------------+-----------------+------------+--------------+
| Bilirubin   | 0.8Comment: Testing      | 0.1 - 1.5 mg/dL | EXTERNAL   |              |
| Total       | performed at [facility], [address] |                 | LAB        |              |
|             |  [address],        |                 |            |              |
|             | [place]  [address]     |                 |            |              |
+-------------+--------------------------+-----------------+------------+--------------+
| ALP,        | 71Comment: Testing       | 35 - 115 U/L    | EXTERNAL   |              |
| External    | performed at [facility], [address] |                 | LAB        |              |
|             |  [address],        |                 |            |              |
|             | [place]  [address]     |                 |            |              |
+-------------+--------------------------+-----------------+------------+--------------+
| AST         | 29Comment: Testing       | 10 - 45 U/L     | EXTERNAL   |              |
|             | performed at [facility], [address] |                 | LAB        |              |
|             |  [address],        |                 |            |              |
|             | [place]  [address]     |                 |            |              |
+-------------+--------------------------+-----------------+------------+--------------+
| ALT         | 31Comment: Testing       | 10 - 65 U/L     | EXTERNAL   |              |
|             | performed at [facility], [address] |                 | LAB        |              |
|             |  [address],        |                 |            |              |
|             | [place]  [address]     |                 |            |              |
+-------------+--------------------------+-----------------+------------+--------------+
| Estimated   | >60Comment: GFR <60:     | mL/min/1.73m2   | EXTERNAL   |              |
| GFR         | CHRONIC KIDNEY DISEASE,  |                 | LAB        |              |
|             | IF FOUND OVER A 3 MONTH  |                 |            |              |
|             | PERIOD.GFR <15: KIDNEY   |                 |            |              |
|             | FAILURE.FOR       |                 |            |              |
|             | [employer], MULTIPLY THE  |                 |            |              |
|             | CALCULATED GFR BY        |                 |            |              |
|             | 1.210.Testing performed  |                 |            |              |
|             | at [facility], [address]           |                 |            |              |
|             | [address],         |                 |            |              |
|             | [place]   [address]    |                 |            |              |
+-------------+--------------------------+-----------------+------------+--------------+
 
 
 
+-----------------+
| Specimen        |
+-----------------+
| Blood specimen  |
| (specimen)      |
 
+-----------------+
 
 
 
+----------------+---------+--------------------+--------------+
| Performing     | Address | City/State/Zipcode | Phone Number |
| Organization   |         |                    |              |
+----------------+---------+--------------------+--------------+
|   EXTERNAL LAB |         |                    |              |
+----------------+---------+--------------------+--------------+
 TSH (2015  5:38 AM PDT)
 
+-----------+--------------------------+--------------+------------+--------------+
| Component | Value                    | Ref Range    | Performed  | Pathologist  |
|           |                          |              | At         | Signature    |
+-----------+--------------------------+--------------+------------+--------------+
| TSH       | 3.25Comment: Testing     | 0.45 - 5.10  | EXTERNAL   |              |
|           | performed at [facility], [address] | uIU/mL       | LAB        |              |
|           |  [address],        |              |            |              |
|           | [place]  [address]     |              |            |              |
+-----------+--------------------------+--------------+------------+--------------+
 
 
 
+-----------------+
| Specimen        |
+-----------------+
| Blood specimen  |
| (specimen)      |
+-----------------+
 
 
 
+----------------+---------+--------------------+--------------+
| Performing     | Address | City/State/Zipcode | Phone Number |
| Organization   |         |                    |              |
+----------------+---------+--------------------+--------------+
|   EXTERNAL LAB |         |                    |              |
+----------------+---------+--------------------+--------------+
 Lipid Panel (2015  5:38 AM PDT)
 
+-------------+--------------------------+-----------+------------+--------------+
| Component   | Value                    | Ref Range | Performed  | Pathologist  |
|             |                          |           | At         | Signature    |
+-------------+--------------------------+-----------+------------+--------------+
| Cholesterol | 206 (H)Comment: Testing  | mg/dL     | EXTERNAL   |              |
|             | performed at [facility], [address] |           | LAB        |              |
|             |  [address],        |           |            |              |
|             | [place]  [address]     |           |            |              |
+-------------+--------------------------+-----------+------------+--------------+
| Triglycerid | 49Comment: Testing       | mg/dL     | EXTERNAL   |              |
| es          | performed at [facility], [address] |           | LAB        |              |
|             |  [address],        |           |            |              |
|             | [place]  [address]     |           |            |              |
+-------------+--------------------------+-----------+------------+--------------+
| HDL         | 87Comment: Testing       | mg/dL     | EXTERNAL   |              |
|             | performed at [facility], [address] |           | LAB        |              |
|             |  [address],        |           |            |              |
|             | [place]  [address]     |           |            |              |
+-------------+--------------------------+-----------+------------+--------------+
 
| LDL,        | 109 (H)Comment: Testing  | mg/dL     | EXTERNAL   |              |
| Calculated  | performed at [facility], [address] |           | LAB        |              |
|             |  [address],        |           |            |              |
|             | [place]  [address]     |           |            |              |
+-------------+--------------------------+-----------+------------+--------------+
 
 
 
+-----------------+
| Specimen        |
+-----------------+
| Blood specimen  |
| (specimen)      |
+-----------------+
 
 
 
+----------------+---------+--------------------+--------------+
| Performing     | Address | City/State/Zipcode | Phone Number |
| Organization   |         |                    |              |
+----------------+---------+--------------------+--------------+
|   EXTERNAL LAB |         |                    |              |
+----------------+---------+--------------------+--------------+
 POC Glucose (2015  5:13 AM PDT)
 
+-------------+--------------------------+---------------+------------+--------------+
| Component   | Value                    | Ref Range     | Performed  | Pathologist  |
|             |                          |               | At         | Signature    |
+-------------+--------------------------+---------------+------------+--------------+
| Glucose,    | 169 (H)Comment: Testing  | 65 - 99 mg/dL | EXTERNAL   |              |
| Fingerstick | performed at [facility];[address]     |               | LAB        |              |
|             | [address];[place], [place]   |               |            |              |
|             | [address]                    |               |            |              |
+-------------+--------------------------+---------------+------------+--------------+
 
 
 
+----------+
| Specimen |
+----------+
|          |
+----------+
 
 
 
+----------------+---------+--------------------+--------------+
| Performing     | Address | City/State/Zipcode | Phone Number |
| Organization   |         |                    |              |
+----------------+---------+--------------------+--------------+
|   EXTERNAL LAB |         |                    |              |
+----------------+---------+--------------------+--------------+
 POC Glucose (2015  4:33 AM PDT)
 
+-------------+-------------------------+---------------+------------+--------------+
| Component   | Value                   | Ref Range     | Performed  | Pathologist  |
|             |                         |               | At         | Signature    |
+-------------+-------------------------+---------------+------------+--------------+
| Glucose,    | 88Comment: Testing      | 65 - 99 mg/dL | EXTERNAL   |              |
| Fingerstick | performed at [facility];[address]    |               | LAB        |              |
|             | [address];[place], [place]  |               |            |              |
 
|             | [address]                   |               |            |              |
+-------------+-------------------------+---------------+------------+--------------+
 
 
 
+----------+
| Specimen |
+----------+
|          |
+----------+
 
 
 
+----------------+---------+--------------------+--------------+
| Performing     | Address | City/State/Zipcode | Phone Number |
| Organization   |         |                    |              |
+----------------+---------+--------------------+--------------+
|   EXTERNAL LAB |         |                    |              |
+----------------+---------+--------------------+--------------+
 Ethanol (2015  3:06 AM PDT)
 
+-----------+-------------------------+-----------+------------+--------------+
| Component | Value                   | Ref Range | Performed  | Pathologist  |
|           |                         |           | At         | Signature    |
+-----------+-------------------------+-----------+------------+--------------+
| Ethyl     | <10Comment: Testing     | mg/dL     | EXTERNAL   |              |
| Alcohol   | performed at [facility];[address]    |           | LAB        |              |
|           | [address];[place], [place]  |           |            |              |
|           | [id number]                   |           |            |              |
+-----------+-------------------------+-----------+------------+--------------+
 
 
 
+-----------------+
| Specimen        |
+-----------------+
| Blood specimen  |
| (specimen)      |
+-----------------+
 
 
 
+----------------+---------+--------------------+--------------+
| Performing     | Address | City/State/Zipcode | Phone Number |
| Organization   |         |                    |              |
+----------------+---------+--------------------+--------------+
|   EXTERNAL LAB |         |                    |              |
+----------------+---------+--------------------+--------------+
 POC Glucose (2015  3:01 AM PDT)
 
+-------------+-------------------------+---------------+------------+--------------+
| Component   | Value                   | Ref Range     | Performed  | Pathologist  |
|             |                         |               | At         | Signature    |
+-------------+-------------------------+---------------+------------+--------------+
| Glucose,    | 75Comment: Testing      | 65 - 99 mg/dL | EXTERNAL   |              |
| Fingerstick | performed at [facility];[address]    |               | LAB        |              |
|             | [address];[place], [place]  |               |            |              |
|             | [id number]                   |               |            |              |
+-------------+-------------------------+---------------+------------+--------------+
 
 
 
 
+----------+
| Specimen |
+----------+
|          |
+----------+
 
 
 
+----------------+---------+--------------------+--------------+
| Performing     | Address | City/State/Zipcode | Phone Number |
| Organization   |         |                    |              |
+----------------+---------+--------------------+--------------+
|   EXTERNAL LAB |         |                    |              |
+----------------+---------+--------------------+--------------+
 documented in this encounter
 
 Visit Diagnoses
 
 
+----------------------------------------------------------------------------------------+
| Diagnosis                                                                              |
+----------------------------------------------------------------------------------------+
|   Nausea and vomiting in adult  Nausea with vomiting                                   |
+----------------------------------------------------------------------------------------+
|   Alcohol withdrawal, uncomplicated (HCC)                                              |
+----------------------------------------------------------------------------------------+
|   Elevated LDH  Nonspecific elevation of levels of transaminase or lactic acid         |
| dehydrogenase (LDH)                                                                    |
+----------------------------------------------------------------------------------------+
|   H/O diabetes mellitus  Personal history of other endocrine, metabolic, and immunity  |
| disorders                                                                              |
+----------------------------------------------------------------------------------------+
|   Elevated blood pressure  Elevated blood pressure reading without diagnosis of        |
| hypertension                                                                           |
+----------------------------------------------------------------------------------------+
|   Alcohol withdrawal, with unspecified complication (HCC)                              |
+----------------------------------------------------------------------------------------+
|   Smoker  Tobacco use disorder                                                         |
+----------------------------------------------------------------------------------------+
 documented in this encounter

## 2020-08-08 NOTE — XMS
Clinical Summary
  Created on: 2020
 
 Pramod Arnold
 External Reference #: [id number]
 : 71
 Sex: Male
 
 Demographics
 
 
+-----------------------+--------------------------+
| Address               | [address] |
|                       | [place]  [address]     |
+-----------------------+--------------------------+
| Preferred Language    | Unknown                  |
+-----------------------+--------------------------+
| Marital Status        | Single                   |
+-----------------------+--------------------------+
| Taoism Affiliation | Unknown                  |
+-----------------------+--------------------------+
| Race                  | Unknown                  |
+-----------------------+--------------------------+
| Ethnic Group          | Unknown                  |
+-----------------------+--------------------------+
 
 
 Author
 
 
+--------------+--------------------------------------------+
| Author       | [facility]  |
|              | and [facility]                                |
+--------------+--------------------------------------------+
| Organization | [facility]  |
|              | and [facility]                                |
+--------------+--------------------------------------------+
| Address      | Unknown                                    |
+--------------+--------------------------------------------+
| Phone        | Unavailable                                |
+--------------+--------------------------------------------+
 
 
 
 Care Team Providers
 
 
+-----------------------+------+-------------+
| Care Team Member Name | Role | Phone       |
+-----------------------+------+-------------+
 PCP  | Unavailable |
+-----------------------+------+-------------+
 
 
 
 Allergies
 Not on File
 
 
 Medications
 Not on file
 
 Active Problems
 Not on file
 
 Social History
 
 
+-------------------+-------+-----------+--------+------+
| Tobacco Use       | Types | Packs/Day | Years  | Date |
|                   |       |           | Used   |      |
+-------------------+-------+-----------+--------+------+
| Current Some Day  |       |           |        |      |
| Smoker            |       |           |        |      |
+-------------------+-------+-----------+--------+------+
 
 
 
+------------------+---------------+
| Sex Assigned at  | Date Recorded |
| Birth            |               |
+------------------+---------------+
| Not on file      |               |
+------------------+---------------+
 
 
 
 Last Filed Vital Signs
 Not on file
 
 Plan of Treatment
 
 
+---------------------+-----------+-------+----------+
| Health Maintenance  | Due Date  | Last  | Comments |
|                     |           | Done  |          |
+---------------------+-----------+-------+----------+
| Vaccine:            |  |       |          |
| Dtap/Tdap/Td (1 -   | 2         |       |          |
| Tdap)               |           |       |          |
+---------------------+-----------+-------+----------+
| Vaccine: Influenza  |  |       |          |
| (#1)                | 0         |       |          |
+---------------------+-----------+-------+----------+
 
 
 
 Results
 Not on filefrom Last 3 Months

## 2020-08-08 NOTE — XMS
PreManage Notification: ANALIA GOMEZ MRN:[id number]
 
Security Information
 
Security Events
No recent Security Events currently on file
 
 
 
CRITERIA MET
------------
- Group Notification
- 6 ED Visits in 6 Months
- [facility] - 2 Visits in 30 Days
 
 
CARE PROVIDERS
-------------------------------------------------------------------------------------
MARY ROY     Physician Assistant: Surgical     07/31/2018-Current
 
PHONE: Unknown
-------------------------------------------------------------------------------------
 
[facility] has no Care Guidelines for this patient.
Care History
Medical/Surgical
09/26/2018    [facility]
 
      - [facility] CONTACTED PATIENT PCP OFFICE AND NOTIFIED OF PATIENT CURRENT MENTAL 
      HEALTH CONDITION DUE TO PATIENT RECENT LOSS OF LONG TIME GIRLFRIEND.
      - CHW CONTACTED BEHAVIORAL HEALTH AT [facility] AND LEFT A MESSAGE WITH 
      ERIN- COUNSELOR AT [facility].
      - [facility] IS UNABLE TO CONTACT PATIENT DUE TO PATIENT PHONE GOING STRAIGHT TO 
      VOICEMAIL AND NO VOICEMAIL IS SET UP.
      - PLEASE REFER PATIENT TO BEHAVIORAL HEALTH SERVICES AT [facility] [phone] IF SEEN IN THE ED, THEY CAN HELP WITH SUBSTANCE ABUSE AND GRIEF SUPPORT.
06/06/2018    [facility]
 
      - Patient has not seen PCP since December 2017. Patient has cancelled and or 
      no showed to all appointments since December.
      - [facility] has tried to work with this patient but patient 
      no shows to appointments.
      - Patient should be directed to [facility] for all non emergent care. If he 
      calls first thing in the morning to [facility] he can be seen for same day
      appointment.
      - Patient was last seen on December 13 th and has done no shows 4 times to the 
      PCP apts.
      - Patient sees Virgie for Podiatry appointments. Care Recommendation: 
      - USE EXTREME CAUTION IN GIVING NARCOTICS TO THIS PATIENT. 
      - Avoid Discharge Narcotic prescriptions if at all possible. Please use 
      clinical judgement. This patient has had 5 or more Emergency Department visits
      in the last 12 months.\T\nbsp; Patient requires education on the scope and
      purpose of the ED as an acute care provider not a Primary Care Provider and
      should not be utilized for chronic conditions.\T\nbsp; If patient returns to
      ED please contact Community Health WorkerKatrina at [phone]. These are
      guidelines and the provider should exercise clinical judgment when providing
      care
E.D. VISIT COUNT (12 MO.)
-------------------------------------------------------------------------------------
 
2 [facility]
 
3 [facility]
 
3 [facility]
-------------------------------------------------------------------------------------
TOTAL 8
-------------------------------------------------------------------------------------
NOTE: Visits indicate total known visits.
 
ED/UCC VISIT TRACKING (12 MO.)
-------------------------------------------------------------------------------------
08/08/2020 17:08
[facility] [place]
 
TYPE: Emergency
 
COMPLAINT:
- FEVER/WEAKNESS
-------------------------------------------------------------------------------------
07/19/2020 17:48
[facility] [place]
 
TYPE: Emergency
 
COMPLAINT:
- TINGLING IN CHEST, NAUSEA
 
DIAGNOSES:
- Essential (primary) hypertension
- Alcohol abuse with intoxication, unspecified
- Allergy status to penicillin
- Type 2 diabetes mellitus without complications
- Type 2 diabetes mellitus with hyperglycemia
- Allergy status to other drugs, medicaments and biological sub
- Alcohol dependence with intoxication, unspecified
- Blood alcohol level of 120-199 mg/100 ml
-------------------------------------------------------------------------------------
07/17/2020 13:49
[facility] [place]
 
TYPE: Emergency
 
COMPLAINT:
- L ARM NUMBNESS   PAIN
 
DIAGNOSES:
- Type 2 diabetes mellitus without complications
- Alcohol dependence, uncomplicated
- Pain in left arm
- Nicotine dependence, unspecified, uncomplicated
- Weakness
- Allergy status to penicillin
- Essential (primary) hypertension
- Alcohol abuse, uncomplicated
- Allergy status to analgesic agent status
-------------------------------------------------------------------------------------
[facility]2020 [facility]
 
DIAGNOSES:
- Alcohol dependence with withdrawal, uncomplicated
- Leg Pain
- Withdrawal (Alcohol)
-------------------------------------------------------------------------------------
03/31/2020 07:05
[facility] [place]
 
TYPE: Emergency
 
COMPLAINT:
- Pain in left thigh
 
DIAGNOSES:
1. Pain in left thigh
-------------------------------------------------------------------------------------
03/31/2020 01:12
[facility] [place]
 
TYPE: Emergency
 
COMPLAINT:
- LEG PAIN
- Pain in left thigh
 
DIAGNOSES:
1. Pain in left thigh
-------------------------------------------------------------------------------------
03/26/2020 16:41
[facility]     [place]
 
[facility]: [facility]
 
DIAGNOSES:
- Cough
- Acute upper respiratory infection, unspecified
-------------------------------------------------------------------------------------
12/26/2019 23:12
[facility] [place]
 
TYPE: Emergency
 
DIAGNOSES:
- Alcohol abuse, uncomplicated
- Alcohol dependence with withdrawal delirium
- Essential (primary) hypertension
- Other visual disturbances
- Abdominal Pain
- Anemia, unspecified
- Nonspecific elevation of levels of transaminase and lactic ac
- Alcohol dependence, uncomplicated
- Alcohol dependence with withdrawal, uncomplicated
- Withdrawal (Alcohol)
- Hematemesis
- Type 2 diabetes mellitus without complications
- Hematemesis
-------------------------------------------------------------------------------------
 
 
 
INPATIENT VISIT TRACKING (12 MO.)
-------------------------------------------------------------------------------------
12/26/2019 23:12
[facility] [place]
 
TYPE: Internal Medicine
 
DIAGNOSES:
- Alcohol dependence with withdrawal delirium
- Nicotine dependence, unspecified, uncomplicated
- Nonspecific elevation of levels of transaminase and lactic ac
- Hemorrhage of anus and rectum
- Other visual disturbances
- Hematemesis
- Major depressive disorder, recurrent, unspecified
- Alcohol dependence with withdrawal, uncomplicated
- Essential (primary) hypertension
- Alcohol dependence, uncomplicated
- Type 2 diabetes mellitus without complications
- Alcohol abuse, uncomplicated
- Anemia, unspecified
-------------------------------------------------------------------------------------
 
[email or web address]

## 2021-01-19 NOTE — XMS
PreManage Notification: ANALIA GOMEZ MRN:[id number]
 
Security Information
 
Security Events
No recent Security Events currently on file
 
 
 
CRITERIA MET
------------
- Group Notification
 
 
CARE PROVIDERS
-------------------------------------------------------------------------------------
MARY ROY     Physician Assistant: Surgical     07/31/2018-Current
 
PHONE: Unknown
-------------------------------------------------------------------------------------
Name [facility]     08/10/2020-Current
 
PHONE: [phone]
-------------------------------------------------------------------------------------
 
[facility] has no Care Guidelines for this patient.
Care History
Medical/Surgical
08/10/2020    [facility]
- PATIENT IS [facility] ELIGIBLE, \T\middot;\T\nbsp; PLEASE REFER PATIENT TO 
      [facility] FOR NON EMERGENT MEDICAL NEEDS. \T\middot;\T\nbsp;
      [facility] CAN SEE PATIENTS SAME DAY FOR APTS IF PATIENT CALLS FIRST
      THING IN THE MORNING.
08/10/2020    [facility]
Care Recommendation: 
      - PLEASE REVIEW PDMP ON THE [facility] 
      - USE EXTREME CAUTION IN GIVING NARCOTICS. 
      - Avoid Discharge Narcotic prescriptions if at all possible. Physician 
      discretion.
E.D. VISIT COUNT (12 MO.)
-------------------------------------------------------------------------------------
3 [facility]
 
2 [facility]
 
4 [facility]
-------------------------------------------------------------------------------------
TOTAL 9
-------------------------------------------------------------------------------------
NOTE: Visits indicate total known visits.
 
ED/UCC VISIT TRACKING (12 MO.)
-------------------------------------------------------------------------------------
01/19/2021 12:50
[facility] [place]
 
TYPE: Emergency
 
COMPLAINT:
 
- SOB
-------------------------------------------------------------------------------------
10/21/2020 17:33
[facility] [place]
 
TYPE: Emergency
 
COMPLAINT:
- Chest pain, unspecified
- Pain in left shoulder
- Shortness of breath
 
DIAGNOSES:
1. Chest pain, unspecified
2. Alcohol abuse with withdrawal, unspecified
3. Homelessness
4. Type 2 diabetes mellitus without complications
5. Essential (primary) hypertension
6. Nicotine dependence, cigarettes, uncomplicated
-------------------------------------------------------------------------------------
08/08/2020 17:08
[facility] [place]
 
TYPE: Emergency
 
COMPLAINT:
- FEVER/WEAKNESS
 
DIAGNOSES:
- Allergy status to analgesic agent
- Weakness
- Nicotine dependence, unspecified, uncomplicated
- Allergy status to penicillin
- Cutaneous abscess of back [any part, except buttock]
- Type 2 diabetes mellitus without complications
- Essential (primary) hypertension
-------------------------------------------------------------------------------------
07/19/2020 17:48
[facility] [place]
 
TYPE: Emergency
 
COMPLAINT:
- TINGLING IN CHEST, NAUSEA
 
DIAGNOSES:
- Essential (primary) hypertension
- Alcohol abuse with intoxication, unspecified
- Allergy status to penicillin
- Type 2 diabetes mellitus without complications
- Type 2 diabetes mellitus with hyperglycemia
- Allergy status to other drugs, medicaments and biological substances
- Alcohol dependence with intoxication, unspecified
- Blood alcohol level of 120-199 mg/100 ml
-------------------------------------------------------------------------------------
07/17/2020 13:49
[facility] [place]
 
TYPE: Emergency
 
 
COMPLAINT:
- L ARM NUMBNESS   PAIN
 
DIAGNOSES:
- Type 2 diabetes mellitus without complications
- Alcohol dependence, uncomplicated
- Pain in left arm
- Nicotine dependence, unspecified, uncomplicated
- Weakness
- Allergy status to penicillin
- Essential (primary) hypertension
- Alcohol abuse, uncomplicated
- Allergy status to analgesic agent
-------------------------------------------------------------------------------------
05/11/2020 03:27
[facility] [place]
 
TYPE: Emergency
 
DIAGNOSES:
- Alcohol dependence with withdrawal, uncomplicated
- Leg Pain
- Withdrawal (Alcohol)
-------------------------------------------------------------------------------------
03/31/2020 07:05
[facility] [place]
 
TYPE: Emergency
 
COMPLAINT:
- Pain in left thigh
 
DIAGNOSES:
1. Pain in left thigh
-------------------------------------------------------------------------------------
03/31/2020 01:12
[facility] [place]
 
TYPE: Emergency
 
COMPLAINT:
- LEG PAIN
- Pain in left thigh
 
DIAGNOSES:
1. Pain in left thigh
-------------------------------------------------------------------------------------
03/26/2020 16:41
[facility] [place]
 
TYPE: Emergency
 
DIAGNOSES:
- Cough
- Acute upper respiratory infection, unspecified
-------------------------------------------------------------------------------------
 
 
INPATIENT VISIT TRACKING (12 MO.)
No inpatient visits to display in this time frame
 
 
[email or web address]

## 2022-04-25 NOTE — XMS
PreManage Notification: ANALIA GOMEZ MRN:[id number]
 
Security Information
 
Security Events
No recent Security Events currently on file
 
 
 
CRITERIA MET
------------
- Group Notification
 
 
CARE PROVIDERS
-------------------------------------------------------------------------------------
MARY ROY     Physician Assistant: Surgical     07/31/2018-Current
 
PHONE: Unknown
-------------------------------------------------------------------------------------
[facility]     08/10/2020-[facility]
 
PHONE: [phone]
-------------------------------------------------------------------------------------
 
[facility] has no Care Guidelines for this patient.
Care History
Medical/Surgical
08/10/2020    [facility]
- PATIENT IS [facility] ELIGIBLE, \T\middot;\T\nbsp; PLEASE REFER PATIENT TO 
      [facility] FOR NON EMERGENT MEDICAL NEEDS. \T\middot;\T\nbsp;
      [facility] CAN SEE PATIENTS SAME DAY FOR APTS IF PATIENT CALLS FIRST
      THING IN THE MORNING.
08/10/2020    [facility]
Care Recommendation: 
      - PLEASE REVIEW PDMP ON THE [facility] 
      - USE EXTREME CAUTION IN GIVING NARCOTICS. 
      - Avoid Discharge Narcotic prescriptions if at all possible. Physician 
      discretion.
E.D. VISIT COUNT (12 MO.)
-------------------------------------------------------------------------------------
[address] [facility]
-------------------------------------------------------------------------------------
TOTAL 2
-------------------------------------------------------------------------------------
NOTE: Visits indicate total known visits.
 
ED/UCC VISIT TRACKING (12 MO.)
-------------------------------------------------------------------------------------
04/25/2022 09:27
[facility] [place]
 
TYPE: Emergency
 
COMPLAINT:
- L BUTTOCK WOUND
-------------------------------------------------------------------------------------
10/24/2021 17:44
 
[facility]     [place]
 
TYPE: Emergency
 
COMPLAINT:
- NECK PAIN, LARGE BUMP ON BACK OF NECK
 
DIAGNOSES:
- Allergy status to other drugs, medicaments and biological substances
- Type 2 diabetes mellitus without complications
- Nicotine dependence, unspecified, uncomplicated
- Essential (primary) hypertension
- Allergy status to penicillin
- Cutaneous abscess of neck
-------------------------------------------------------------------------------------
 
 
INPATIENT VISIT TRACKING (12 MO.)
No inpatient visits to display in this time frame
 
[email or web address]
The patient is a 3m1[age] Female complaining of eye discharge.